# Patient Record
Sex: FEMALE | Race: WHITE | NOT HISPANIC OR LATINO | Employment: FULL TIME | URBAN - METROPOLITAN AREA
[De-identification: names, ages, dates, MRNs, and addresses within clinical notes are randomized per-mention and may not be internally consistent; named-entity substitution may affect disease eponyms.]

---

## 2019-09-09 ENCOUNTER — OFFICE VISIT (OUTPATIENT)
Dept: FAMILY MEDICINE CLINIC | Facility: CLINIC | Age: 24
End: 2019-09-09
Payer: COMMERCIAL

## 2019-09-09 VITALS
HEART RATE: 83 BPM | RESPIRATION RATE: 17 BRPM | DIASTOLIC BLOOD PRESSURE: 86 MMHG | TEMPERATURE: 97.6 F | SYSTOLIC BLOOD PRESSURE: 122 MMHG | OXYGEN SATURATION: 98 % | HEIGHT: 62 IN | WEIGHT: 137 LBS | BODY MASS INDEX: 25.21 KG/M2

## 2019-09-09 DIAGNOSIS — Z76.89 ENCOUNTER TO ESTABLISH CARE WITH NEW DOCTOR: ICD-10-CM

## 2019-09-09 DIAGNOSIS — Z13.220 LIPID SCREENING: ICD-10-CM

## 2019-09-09 DIAGNOSIS — L40.9 PSORIASIS: Primary | ICD-10-CM

## 2019-09-09 DIAGNOSIS — Z13.1 ENCOUNTER FOR SCREENING EXAMINATION FOR IMPAIRED GLUCOSE REGULATION AND DIABETES MELLITUS: ICD-10-CM

## 2019-09-09 PROCEDURE — 99203 OFFICE O/P NEW LOW 30 MIN: CPT | Performed by: FAMILY MEDICINE

## 2019-09-09 PROCEDURE — 3008F BODY MASS INDEX DOCD: CPT | Performed by: FAMILY MEDICINE

## 2019-09-09 RX ORDER — TRIAMCINOLONE ACETONIDE 1 MG/G
CREAM TOPICAL 2 TIMES DAILY
Qty: 30 G | Refills: 0 | Status: SHIPPED | OUTPATIENT
Start: 2019-09-09

## 2019-09-09 RX ORDER — DESOGESTREL AND ETHINYL ESTRADIOL 21-5 (28)
1 KIT ORAL DAILY
COMMUNITY
Start: 2019-04-26 | End: 2019-12-04 | Stop reason: ALTCHOICE

## 2019-09-09 NOTE — PROGRESS NOTES
Assessment/Plan:         Diagnoses and all orders for this visit:    Psoriasis  -     triamcinolone (KENALOG) 0 1 % cream; Apply topically 2 (two) times a day    Encounter to establish care with new doctor  Comments:  in good health; screening labs ordered, return for annual physical at her convenience    Lipid screening  -     Lipid panel; Future    Encounter for screening examination for impaired glucose regulation and diabetes mellitus  -     Comprehensive metabolic panel; Future            Subjective:   Chief Complaint   Patient presents with   Purific[address] 1076 PCP visit was about a year ago         Patient ID: Jessica Cuba is a 30 y o  female  New pt  Sees GYN at [place], no problems  Reports sleep problems 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, during summer goes away- sleep issue is awakening 2-3am for no reason and then hard to fall back to sleep  Pt works as       The following portions of the patient's history were reviewed and updated as appropriate: allergies, current medications, past family history, past medical history, past social history, past surgical history and problem list     Review of Systems   Constitutional: Negative  HENT: Negative for congestion, ear pain, mouth sores, nosebleeds, sore throat and trouble swallowing  Eyes: Negative  Respiratory: Negative  Cardiovascular: Negative  Gastrointestinal: Negative  Endocrine: Negative  Genitourinary: Negative for decreased urine volume, difficulty urinating, dysuria, flank pain, frequency and hematuria  Musculoskeletal: Negative for gait problem and joint swelling  Skin: Negative  Neurological: Negative  Hematological: Negative  Psychiatric/Behavioral: Positive for sleep disturbance (per hpi)  Negative for agitation, behavioral problems, confusion, decreased concentration, dysphoric mood, hallucinations, self-injury and suicidal ideas   The patient is not nervous/anxious and is not hyperactive  Objective:      /86 (BP Location: Left arm, Patient Position: Sitting, Cuff Size: Adult)   Pulse 83   Temp 97 6 °F (36 4 °C) (Tympanic)   Resp 17   Ht 5' 2 25" (1 581 m)   Wt 62 1 kg (137 lb)   SpO2 98%   BMI 24 86 kg/m²          Physical Exam   Constitutional: She is oriented to person, place, and time  She appears well-developed  She is cooperative  Non-toxic appearance  She does not have a sickly appearance  She does not appear ill  No distress  HENT:   Head: Normocephalic and atraumatic  Mouth/Throat: Uvula is midline, oropharynx is clear and moist and mucous membranes are normal    Eyes: Pupils are equal, round, and reactive to light  Conjunctivae and lids are normal    Neck: Trachea normal  Neck supple  No JVD present  No thyroid mass and no thyromegaly present  Cardiovascular: Normal rate, regular rhythm, normal heart sounds and normal pulses  Pulmonary/Chest: Effort normal and breath sounds normal    Abdominal: Soft  Bowel sounds are normal  She exhibits no distension and no mass  There is no hepatosplenomegaly  There is no tenderness  Lymphadenopathy:     She has no cervical adenopathy  Right: No supraclavicular adenopathy present  Left: No supraclavicular adenopathy present  Neurological: She is alert and oriented to person, place, and time  She has normal reflexes  Gait normal    Skin: Skin is warm and dry  Capillary refill takes less than 2 seconds  She is not diaphoretic  No cyanosis  No pallor  Psychiatric: She has a normal mood and affect  Her behavior is normal    Nursing note and vitals reviewed

## 2019-11-29 ENCOUNTER — APPOINTMENT (OUTPATIENT)
Dept: RADIOLOGY | Facility: HOSPITAL | Age: 24
End: 2019-11-29
Payer: COMMERCIAL

## 2019-11-29 ENCOUNTER — OFFICE VISIT (OUTPATIENT)
Dept: URGENT CARE | Facility: HOSPITAL | Age: 24
End: 2019-11-29
Payer: COMMERCIAL

## 2019-11-29 VITALS
SYSTOLIC BLOOD PRESSURE: 120 MMHG | DIASTOLIC BLOOD PRESSURE: 96 MMHG | WEIGHT: 136 LBS | OXYGEN SATURATION: 100 % | HEART RATE: 89 BPM | BODY MASS INDEX: 24.1 KG/M2 | HEIGHT: 63 IN | TEMPERATURE: 98 F

## 2019-11-29 DIAGNOSIS — S69.91XA INJURY OF RIGHT HAND, INITIAL ENCOUNTER: ICD-10-CM

## 2019-11-29 DIAGNOSIS — S62.356A CLOSED NONDISPLACED FRACTURE OF SHAFT OF FIFTH METACARPAL BONE OF RIGHT HAND, INITIAL ENCOUNTER: Primary | ICD-10-CM

## 2019-11-29 PROCEDURE — 73130 X-RAY EXAM OF HAND: CPT

## 2019-11-29 PROCEDURE — 29125 APPL SHORT ARM SPLINT STATIC: CPT | Performed by: NURSE PRACTITIONER

## 2019-11-29 PROCEDURE — 99213 OFFICE O/P EST LOW 20 MIN: CPT | Performed by: NURSE PRACTITIONER

## 2019-11-29 NOTE — PROGRESS NOTES
[facility]        NAME: Pilar Craig is a 30 y o  female  : 1995    MRN: [id number]  DATE: 2019  TIME: 2:52 PM    Assessment and Plan   Closed nondisplaced fracture of shaft of fifth metacarpal bone of right hand, initial encounter [S67 [id number]]  1  Closed nondisplaced fracture of shaft of fifth metacarpal bone of right hand, initial encounter  XR hand 3+ vw right     Splint application  Date/Time: 2019 2:52 PM  Performed by: OLIVE Burris  Authorized by: OLIVE Burris     Consent:     Consent obtained:  Verbal    Consent given by:  Patient    Risks discussed:  Discoloration, numbness, swelling and pain  Pre-procedure details:     Sensation:  Normal  Procedure details:     Laterality:  Right    Location:  Hand    Hand:  R hand    Splint type:  Ulnar gutter (static)  Post-procedure details:     Pain:  Unchanged    Sensation:  Normal    Patient tolerance of procedure: Tolerated well, no immediate complications        Patient Instructions     Patient Instructions   Keep splint on and keep dry  ortho appointment was scheduled for you  Recommend Tylenol every 4-6 hours and Motrin every 6-8 as needed for pain  Recommend elevating and icing right hand for 15 minutes increments  If you develop any increased pain, numbness, tingling, decreased sensation or any concerning symptoms please return or proceed ER  Hand Fracture   WHAT YOU NEED TO KNOW:   A hand fracture is a break in one of the bones in your hand  This includes the bones in the wrist and fingers, and those that connect the wrist to the fingers  A hand fracture may be caused by twisting or bending the hand in the wrong way  It may also be caused by a fall, a crush injury, or a sports injury  DISCHARGE INSTRUCTIONS:   Return to the emergency department if:   · Your have severe pain that does not get better, even with pain medicine  · Your injured hand or forearm is cold, numb, or pale       · Your cast or splint gets wet, damaged, or comes off  · Your arm feels warm, tender, and painful  It may look swollen and red  Contact your healthcare provider if:   · You have new sores around your brace, cast, or splint  · You notice a bad smell coming from under your cast     · You have questions or concerns about your condition or care  Medicines:   · NSAIDs , such as ibuprofen, help decrease swelling, pain, and fever  This medicine is available with or without a doctor's order  NSAIDs can cause stomach bleeding or kidney problems in certain people  If you take blood thinner medicine, always ask your healthcare provider if NSAIDs are safe for you  Always read the medicine label and follow directions  · Acetaminophen  decreases pain and fever  It is available without a doctor's order  Ask how much to take and how often to take it  Follow directions  Acetaminophen can cause liver damage if not taken correctly  · Prescription pain medicine  may be given  Ask how to take this medicine safely  · Take your medicine as directed  Contact your healthcare provider if you think your medicine is not helping or if you have side effects  Tell him or her if you are allergic to any medicine  Keep a list of the medicines, vitamins, and herbs you take  Include the amounts, and when and why you take them  Bring the list or the pill bottles to follow-up visits  Carry your medicine list with you in case of an emergency  Follow up with your healthcare provider or hand specialist as directed: You may need to return to have your cast, splint, or stitches removed  Write down your questions so you remember to ask them during your visits  Manage your symptoms:   · Wear your splint as directed  Do not remove the splint until you follow up with your healthcare provider or hand specialist      · Apply ice  on your hand for 15 to 20 minutes every hour or as directed  Use an ice pack, or put crushed ice in a plastic bag   Cover it with a towel before you apply it to your skin  Ice helps prevent tissue damage and decreases swelling and pain  · Elevate  your hand above the level of his or her heart as often as you can  This will help decrease swelling and pain  Prop your hand on pillows or blankets to keep it elevated comfortably  · Go to physical therapy as directed  A physical therapist teaches you exercises to help improve movement and strength and to decrease pain  Bathing with a cast or splint:  Do not let your cast or splint get wet  Before bathing, cover the cast or splint with a plastic bag  Tape the bag to your skin above the cast or splint to seal out the water  Keep your hand out of the water in case the bag leaks  Follow instructions about when it is okay to take a bath or shower  Cast or splint care:   · Check the skin around the cast or splint for redness or sores every day  · Do not push down or lean on any part of the cast or splint because it may break  · Do not use a sharp or pointed object to scratch your skin under the cast or splint  Activity:  You may not be able to drive for up to 2 weeks  Ask when it is safe for you to drive and return to other activities such as sports  © 2017 [address]  Information is for End User's use only and may not be sold, redistributed or otherwise used for commercial purposes  All illustrations and images included in CareNotes® are the copyrighted property of [employer]  or [employer]  The above information is an  only  It is not intended as medical advice for individual conditions or treatments  Talk to your doctor, nurse or pharmacist before following any medical regimen to see if it is safe and effective for you  Follow up with PCP in 3-5 days  Proceed to  ER if symptoms worsen      Chief Complaint     Chief Complaint   Patient presents with    Pain     R hand pain injury while working on truck last night History of Present Illness       Patient is a 61-year-old female presents with a one-day history of right hand pain  Patient states she was working on her truck and had a ratchet in her right hand, states that ratchet slipped and she hit her right hand on the frame of the truck  Patient complaining of pain and swelling of right hand  Patient denies any numbness, tingling, decreased sensation, decreased range of motion of right hand  Patient denies any previous injury or trauma to right hand  States that she took Motrin yesterday with moderate relief  Review of Systems   Review of Systems   Constitutional: Negative  HENT: Negative  Eyes: Negative  Respiratory: Negative  Cardiovascular: Negative  Musculoskeletal: Positive for arthralgias and joint swelling  Negative for back pain, gait problem, myalgias, neck pain and neck stiffness  Skin: Negative for rash and wound  Neurological: Negative  Current Medications       Current Outpatient Medications:     triamcinolone (KENALOG) 0 1 % cream, Apply topically 2 (two) times a day, Disp: 30 g, Rfl: 0    desogestrel-ethinyl estradiol (Daniel Marsh) 0 15-0 02/0 01 MG (21/5) per tablet, Take 1 tablet by mouth daily, Disp: , Rfl:     Current Allergies     Allergies as of 11/29/2019 - Reviewed 11/29/2019   Allergen Reaction Noted    Penicillin g Hives 01/22/2016            The following portions of the patient's history were reviewed and updated as appropriate: allergies, current medications, past family history, past medical history, past social history, past surgical history and problem list      No past medical history on file  No past surgical history on file      Family History   Problem Relation Age of Onset    Hypertension Mother     Hyperlipidemia Father     Breast cancer Maternal Grandmother     Diabetes Paternal Grandfather     Cancer Paternal Grandfather     No Known Problems Brother          Medications have been verified  Objective   /96   Pulse 89   Temp 98 °F (36 7 °C)   Ht 5' 3" (1 6 m)   Wt 61 7 kg (136 lb)   SpO2 100%   BMI 24 09 kg/m²        Physical Exam     Physical Exam   Constitutional: She is oriented to person, place, and time  She appears well-developed and well-nourished  No distress  HENT:   Head: Normocephalic and atraumatic  Cardiovascular: Normal rate, regular rhythm, S1 normal, S2 normal, normal heart sounds, intact distal pulses and normal pulses  Pulses:       Radial pulses are 2+ on the right side, and 2+ on the left side  Pulmonary/Chest: Effort normal and breath sounds normal    Musculoskeletal:        Right hand: She exhibits tenderness, bony tenderness and swelling  She exhibits normal range of motion, normal capillary refill, no deformity and no laceration  Normal sensation noted  Normal strength noted  Hands:  Neurological: She is alert and oriented to person, place, and time  GCS eye subscore is 4  GCS verbal subscore is 5  GCS motor subscore is 6  Skin: Skin is warm and dry  Capillary refill takes less than 2 seconds  She is not diaphoretic

## 2019-11-29 NOTE — PATIENT INSTRUCTIONS
Keep splint on and keep dry  ortho appointment was scheduled for you  Recommend Tylenol every 4-6 hours and Motrin every 6-8 as needed for pain  Recommend elevating and icing right hand for 15 minutes increments  If you develop any increased pain, numbness, tingling, decreased sensation or any concerning symptoms please return or proceed ER  Hand Fracture   WHAT YOU NEED TO KNOW:   A hand fracture is a break in one of the bones in your hand  This includes the bones in the wrist and fingers, and those that connect the wrist to the fingers  A hand fracture may be caused by twisting or bending the hand in the wrong way  It may also be caused by a fall, a crush injury, or a sports injury  DISCHARGE INSTRUCTIONS:   Return to the emergency department if:   · Your have severe pain that does not get better, even with pain medicine  · Your injured hand or forearm is cold, numb, or pale  · Your cast or splint gets wet, damaged, or comes off  · Your arm feels warm, tender, and painful  It may look swollen and red  Contact your healthcare provider if:   · You have new sores around your brace, cast, or splint  · You notice a bad smell coming from under your cast     · You have questions or concerns about your condition or care  Medicines:   · NSAIDs , such as ibuprofen, help decrease swelling, pain, and fever  This medicine is available with or without a doctor's order  NSAIDs can cause stomach bleeding or kidney problems in certain people  If you take blood thinner medicine, always ask your healthcare provider if NSAIDs are safe for you  Always read the medicine label and follow directions  · Acetaminophen  decreases pain and fever  It is available without a doctor's order  Ask how much to take and how often to take it  Follow directions  Acetaminophen can cause liver damage if not taken correctly  · Prescription pain medicine  may be given  Ask how to take this medicine safely      · Take your medicine as directed  Contact your healthcare provider if you think your medicine is not helping or if you have side effects  Tell him or her if you are allergic to any medicine  Keep a list of the medicines, vitamins, and herbs you take  Include the amounts, and when and why you take them  Bring the list or the pill bottles to follow-up visits  Carry your medicine list with you in case of an emergency  Follow up with your healthcare provider or hand specialist as directed: You may need to return to have your cast, splint, or stitches removed  Write down your questions so you remember to ask them during your visits  Manage your symptoms:   · Wear your splint as directed  Do not remove the splint until you follow up with your healthcare provider or hand specialist      · Apply ice  on your hand for 15 to 20 minutes every hour or as directed  Use an ice pack, or put crushed ice in a plastic bag  Cover it with a towel before you apply it to your skin  Ice helps prevent tissue damage and decreases swelling and pain  · Elevate  your hand above the level of his or her heart as often as you can  This will help decrease swelling and pain  Prop your hand on pillows or blankets to keep it elevated comfortably  · Go to physical therapy as directed  A physical therapist teaches you exercises to help improve movement and strength and to decrease pain  Bathing with a cast or splint:  Do not let your cast or splint get wet  Before bathing, cover the cast or splint with a plastic bag  Tape the bag to your skin above the cast or splint to seal out the water  Keep your hand out of the water in case the bag leaks  Follow instructions about when it is okay to take a bath or shower  Cast or splint care:   · Check the skin around the cast or splint for redness or sores every day  · Do not push down or lean on any part of the cast or splint because it may break      · Do not use a sharp or pointed object to scratch your skin under the cast or splint  Activity:  You may not be able to drive for up to 2 weeks  Ask when it is safe for you to drive and return to other activities such as sports  © 2017 [address] [place] Information is for End User's use only and may not be sold, redistributed or otherwise used for commercial purposes  All illustrations and images included in CareNotes® are the copyrighted property of [employer] Inc  or [employer] Daniel  The above information is an  only  It is not intended as medical advice for individual conditions or treatments  Talk to your doctor, nurse or pharmacist before following any medical regimen to see if it is safe and effective for you

## 2019-12-02 RX ORDER — MEDROXYPROGESTERONE ACETATE 150 MG/ML
150 INJECTION, SUSPENSION INTRAMUSCULAR
COMMUNITY
Start: 2019-09-19

## 2019-12-04 ENCOUNTER — OFFICE VISIT (OUTPATIENT)
Dept: OBGYN CLINIC | Facility: CLINIC | Age: 24
End: 2019-12-04
Payer: COMMERCIAL

## 2019-12-04 ENCOUNTER — APPOINTMENT (OUTPATIENT)
Dept: RADIOLOGY | Facility: CLINIC | Age: 24
End: 2019-12-04
Payer: COMMERCIAL

## 2019-12-04 VITALS
SYSTOLIC BLOOD PRESSURE: 110 MMHG | HEART RATE: 96 BPM | DIASTOLIC BLOOD PRESSURE: 62 MMHG | WEIGHT: 140 LBS | BODY MASS INDEX: 24.8 KG/M2 | HEIGHT: 63 IN

## 2019-12-04 DIAGNOSIS — M79.641 PAIN OF RIGHT HAND: Primary | ICD-10-CM

## 2019-12-04 DIAGNOSIS — S62.356A CLOSED NONDISPLACED FRACTURE OF SHAFT OF FIFTH METACARPAL BONE OF RIGHT HAND, INITIAL ENCOUNTER: ICD-10-CM

## 2019-12-04 PROCEDURE — 99204 OFFICE O/P NEW MOD 45 MIN: CPT | Performed by: ORTHOPAEDIC SURGERY

## 2019-12-04 PROCEDURE — 73130 X-RAY EXAM OF HAND: CPT

## 2019-12-04 PROCEDURE — 26600 TREAT METACARPAL FRACTURE: CPT | Performed by: ORTHOPAEDIC SURGERY

## 2019-12-04 NOTE — PROGRESS NOTES
ASSESSMENT/PLAN:    Diagnoses and all orders for this visit:    Pain of right hand    Closed nondisplaced fracture of shaft of fifth metacarpal bone of right hand, initial encounter      Plan:  I discussed treatment options  Since there is a very slight degree of rotational malalignment appreciated flexion, a derotational maneuver was performed followed by taping of the small finger to the ring finger  An ulnar gauntlet cast was then applied  Post casting x-rays were obtained demonstrating the fracture to remain acceptably aligned with a minimal degree of volar angulation  I would recommend follow-up 5 weeks with cast removal followed by x-rays out of her cast   Precautions have been reviewed, instructions provided and questions answered  She is to contact me if any questions or concerns arise  No follow-ups on file       _____________________________________________________  CHIEF COMPLAINT:  Chief Complaint   Patient presents with    Right Hand - Fracture, Pain, Swelling    Hand Injury     pt states injured R hand working her truck, when attempt lossen a bolt, hand hit frame of vehicle  SUBJECTIVE:  Skye Aguirre is a 30y o  year old right-hand-dominant female who presents for evaluation of her right hand femur she injured herself on 11/28/2019  She was working on a car, the bolt she loosening snapped and her slipped, striking the frame of the vehicle  She was seen at the urgent care center on 11/29/2019, x-rays were obtained and she was placed into a splint  She now presents for orthopedic evaluation and treatment  He does believe there has been some minor improvement over the last 6 days  She denies paresthesias  She denies history of prior injury and denies any additional injuries  PAST MEDICAL HISTORY:  Past Medical History:   Diagnosis Date    Fractures     Hand injury, right, initial encounter 11/28/2019       PAST SURGICAL HISTORY:  History reviewed   No pertinent surgical history  FAMILY HISTORY:  Family History   Problem Relation Age of Onset    Hypertension Mother     Hyperlipidemia Father     Breast cancer Maternal Grandmother     Diabetes Paternal Grandfather     Cancer Paternal Grandfather     No Known Problems Brother        SOCIAL HISTORY:  Social History     Tobacco Use    Smoking status: Never Smoker    Smokeless tobacco: Never Used   Substance Use Topics    Alcohol use: Yes     Frequency: Monthly or less     Comment: socailly     Drug use: Never       MEDICATIONS:    Current Outpatient Medications:     medroxyPROGESTERone (DEPO-PROVERA) 150 mg/mL injection, Inject 150 mg into a muscle, Disp: , Rfl:     triamcinolone (KENALOG) 0 1 % cream, Apply topically 2 (two) times a day, Disp: 30 g, Rfl: 0    ALLERGIES:  Allergies   Allergen Reactions    Penicillin G Hives       Review of systems:   Constitutional: Negative for fatigue, fever or loss of apetite  HENT: Negative  Respiratory: Negative for shortness of breath, dyspnea  Cardiovascular: Negative for chest pain/tightness  Gastrointestinal: Negative for abdominal pain, N/V  Endocrine: Negative for cold/heat intolerance, unexplained weight loss/gain  Genitourinary: Negative for flank pain, dysuria, hematuria  Musculoskeletal:  Positive as in the HPI   Skin: Negative for rash  Neurological:  Negative  Psychiatric/Behavioral: Negative for agitation  _____________________________________________________  PHYSICAL EXAMINATION:    Blood pressure 110/62, pulse 96, height 5' 3" (1 6 m), weight 63 5 kg (140 lb)  General: well developed and well nourished, alert, oriented times 3 and appears comfortable  Psychiatric: Normal  HEENT: Benign  Cardiovascular: Regular    Pulmonary: No wheezing or stridor  Abdomen: Soft, Nontender  Skin: No masses, erythema, lacerations, fluctation, ulcerations  Neurovascular: Motor and sensory exams are grossly intact  Pulses palpable      MUSCULOSKELETAL EXAMINATION:    The right hand exam demonstrates the splint in place  This was removed without difficulty  There is no gross deformity in the right hand has minimal swelling  There is resolving ecchymosis noted  She does have tenderness to palpation of the 5th metacarpal, primarily along its ulnar aspect  With flexion, there is a slight supination deformity although in extension, there is no rotational or angular malalignment noted  The supination deformity is not significantly different than the contralateral left side during active flexion of both the left and right sides in comparison  The remaining metacarpals of the right hand and the phalanges of all digits are nontender  The remainder of the upper extremity examination, bilaterally, is benign  _____________________________________________________  STUDIES REVIEWED:  X-rays of her hand dated 11/29/2019 were reviewed  These demonstrated a fracture of the right 5th metacarpal shaft with a minimal degree of volar angulation  There does not appear to be any significant rotational malalignment more shortening noted  The report was reviewed  PROCEDURES:  Fracture / Dislocation Treatment  Date/Time: 12/4/2019 4:07 PM  Performed by: Behzad Garner  Authorized by: Behzad Garner     Patient Location:  Clinic  Verbal consent obtained?: Yes    Written consent obtained?: No    Risks and benefits: Risks, benefits and alternatives were discussed    Consent given by:  Patient  Patient states understanding of procedure being performed: Yes    Test results available and properly labeled: Yes    Radiology Images displayed and confirmed   If images not available, report reviewed: Yes    Injury location:  Hand  Location details:  Right hand  Injury type:  Fracture  Fracture type: fifth metacarpal    Neurovascular status: Neurovascularly intact    Local anesthesia used?: No    Manipulation performed?: No    Cast type:  Gaunlet  Patient tolerance:  Patient tolerated the procedure well with no immediate complications          Nataly Rachel

## 2020-01-08 ENCOUNTER — OFFICE VISIT (OUTPATIENT)
Dept: OBGYN CLINIC | Facility: CLINIC | Age: 25
End: 2020-01-08

## 2020-01-08 ENCOUNTER — APPOINTMENT (OUTPATIENT)
Dept: RADIOLOGY | Facility: CLINIC | Age: 25
End: 2020-01-08
Payer: COMMERCIAL

## 2020-01-08 VITALS
HEIGHT: 63 IN | SYSTOLIC BLOOD PRESSURE: 122 MMHG | DIASTOLIC BLOOD PRESSURE: 70 MMHG | BODY MASS INDEX: 24.1 KG/M2 | WEIGHT: 136 LBS

## 2020-01-08 DIAGNOSIS — S62.356D CLOSED NONDISPLACED FRACTURE OF SHAFT OF FIFTH METACARPAL BONE OF RIGHT HAND WITH ROUTINE HEALING, SUBSEQUENT ENCOUNTER: ICD-10-CM

## 2020-01-08 DIAGNOSIS — S62.356D CLOSED NONDISPLACED FRACTURE OF SHAFT OF FIFTH METACARPAL BONE OF RIGHT HAND WITH ROUTINE HEALING, SUBSEQUENT ENCOUNTER: Primary | ICD-10-CM

## 2020-01-08 PROCEDURE — 99024 POSTOP FOLLOW-UP VISIT: CPT | Performed by: ORTHOPAEDIC SURGERY

## 2020-01-08 PROCEDURE — 73130 X-RAY EXAM OF HAND: CPT

## 2020-01-08 NOTE — PROGRESS NOTES
Patient Name:  Bhavin Sweeney  MRN:  [id number]    Assessment     1  Closed nondisplaced fracture of shaft of fifth metacarpal bone of right hand with routine healing, subsequent encounter  XR hand 3+ vw right    Ambulatory referral to PT/OT hand therapy       Plan     1  Discussed with patient that although she show signs of healing, fracture is not fully healed at this time  2  Ulnar gutter Ortho Glass splint applied at time of visit  3  Referral to occupational therapy for custom ulnar gutter splint  4  Referral to occupational therapy to work on range of motion  5  Continue NSAIDs/Tylenol as needed for pain and soreness    Return 3-4 weeks, for Repeat x-rays 3 views right hand, , Re-evaluation  Subjective   Bhavin Sweeney returns for follow-up of right 5th metacarpal fracture  The patient is 6 week(s) post injury and returns for routine follow-up  Patient has been consistent with cast care instructions  She reports occasional pain in the cast, for which she was able to take Advil and successfully alleviated symptoms  She denies any subsequent bruising, swelling, numbness, or tingling  Objective     /70 (BP Location: Left arm, Patient Position: Sitting, Cuff Size: Adult)   Ht 5' 3" (1 6 m)   Wt 61 7 kg (136 lb)   BMI 24 09 kg/m²     Right hand - ulnar gutter cast was intact at time of visit  There is no signs of ecchymosis or soft tissue swelling either proximal or distal to the cast   Cast was removed at time of visit without difficulty  Skin is warm and dry with no signs of erythema, ecchymosis, or infection  Patient is nontender to palpation over the 5th metacarpal shaft  She demonstrates small and ring finger MP joint stiffness secondary to immobilization  FDS, FDP, and extensor mechanisms are intact  Radial, median, and ulnar motor and sensory distributions are intact  2+ distal radial pulse with brisk capillary refill to the fingers    Sensation light touch intact distally  Data Review     Attending Physician has personally reviewed pertinent imaging and/or reports in PACS, impression is as follows:    Review of radiographic series taken 1/8/2020 of the right his shows maintained alignment of 5th metacarpal shaft fracture with early callus formation      Scribe Attestation    I,:   Narciso Ames am acting as a scribe while in the presence of the attending physician :        I,:   Deysi Escudero personally performed the services described in this documentation    as scribed in my presence :

## 2020-01-13 ENCOUNTER — OFFICE VISIT (OUTPATIENT)
Dept: URGENT CARE | Facility: HOSPITAL | Age: 25
End: 2020-01-13
Payer: COMMERCIAL

## 2020-01-13 VITALS
HEART RATE: 92 BPM | TEMPERATURE: 97.3 F | BODY MASS INDEX: 22.71 KG/M2 | SYSTOLIC BLOOD PRESSURE: 129 MMHG | WEIGHT: 133 LBS | DIASTOLIC BLOOD PRESSURE: 90 MMHG | OXYGEN SATURATION: 100 % | RESPIRATION RATE: 18 BRPM | HEIGHT: 64 IN

## 2020-01-13 DIAGNOSIS — S06.0X0A CONCUSSION WITHOUT LOSS OF CONSCIOUSNESS, INITIAL ENCOUNTER: Primary | ICD-10-CM

## 2020-01-13 PROCEDURE — 99213 OFFICE O/P EST LOW 20 MIN: CPT | Performed by: NURSE PRACTITIONER

## 2020-01-13 NOTE — LETTER
January 13, 2020     Patient: Pilar Craig   YOB: 1995   Date of Visit: 1/13/2020       To Whom It May Concern: It is my medical opinion that Faridaemerson Mckenzie may return to work on 01/14/2020  If you have any questions or concerns, please don't hesitate to call           Sincerely,        GARDENIA MUJICA    CC: No Recipients

## 2020-01-13 NOTE — PROGRESS NOTES
[facility]        NAME: Krista Paget is a 30 y o  female  : 1995    MRN: [id number]  DATE: 2020  TIME: 4:24 PM    Assessment and Plan   Concussion without loss of consciousness, initial encounter [S06 [id number]]  1  Concussion without loss of consciousness, initial encounter  Ambulatory referral to Sports Medicine         Patient Instructions     Patient Instructions   As we discussed symptoms seem to be consistent with a concussion  At this time I would recommend brain rest which include avoiding bright DVTs, cell phones, bright lights, avoid excessive reading  Tylenol or Motrin as needed for pain  Follow up with concussion clinic or PCP  Go to the ER with any worsening symptoms, increasing headache, nausea, vomiting, dizziness or lethargy  Chief Complaint     Chief Complaint   Patient presents with    Headache     Patient c/o headaches x 1 week after hitting head off of medicine cabinet at home, denies LOC         History of Present Illness   Krista Paget presents to the clinic c/o    This is a 57-year-old female here today with complaints of frontal headache  She states 10 days ago she had the back of her head on the medicine cabinet when she was bent over and then went to a standing position  She states 2 days later she started to develop headache  She denies any loss of consciousness during the injury  She states pain is primarily over the frontal aspect of her head  She states she does have some light sensitivity and sensitivity to noise  She denies any nausea, vomiting, dizziness, confusion or change in gait  No history of concussions in the past   No fevers bodies or chills      Review of Systems   Review of Systems   Constitutional: Negative  Negative for activity change, fatigue and fever  Eyes: Negative for photophobia, pain, redness and visual disturbance  Respiratory: Negative  Cardiovascular: Negative  Musculoskeletal: Negative      Skin: Negative  Neurological: Positive for headaches  Negative for dizziness, syncope, weakness and light-headedness  Psychiatric/Behavioral: Negative  Current Medications     Long-Term Medications   Medication Sig Dispense Refill    medroxyPROGESTERone (DEPO-PROVERA) 150 mg/mL injection Inject 150 mg into a muscle      triamcinolone (KENALOG) 0 1 % cream Apply topically 2 (two) times a day (Patient not taking: Reported on 1/13/2020) 30 g 0       Current Allergies     Allergies as of 01/13/2020 - Reviewed 01/13/2020   Allergen Reaction Noted    Penicillin g Hives 01/22/2016            The following portions of the patient's history were reviewed and updated as appropriate: allergies, current medications, past family history, past medical history, past social history, past surgical history and problem list     Objective   /90   Pulse 92   Temp (!) 97 3 °F (36 3 °C) (Tympanic)   Resp 18   Ht 5' 4" (1 626 m)   Wt 60 3 kg (133 lb)   SpO2 100%   BMI 22 83 kg/m²        Physical Exam     Physical Exam   Constitutional: She is oriented to person, place, and time  She appears well-developed and well-nourished  Cardiovascular: Normal rate, regular rhythm and normal heart sounds  Pulmonary/Chest: Effort normal and breath sounds normal    Neurological: She is alert and oriented to person, place, and time  No cranial nerve deficit or sensory deficit  Coordination normal    Psychiatric: She has a normal mood and affect  Her behavior is normal    Nursing note and vitals reviewed

## 2020-01-13 NOTE — PATIENT INSTRUCTIONS
As we discussed symptoms seem to be consistent with a concussion  At this time I would recommend brain rest which include avoiding bright DVTs, cell phones, bright lights, avoid excessive reading  Tylenol or Motrin as needed for pain  Follow up with concussion clinic or PCP  Go to the ER with any worsening symptoms, increasing headache, nausea, vomiting, dizziness or lethargy  Concussion   WHAT YOU NEED TO KNOW:   A concussion is a mild brain injury  It is usually caused by a bump or blow to the head from a fall, a motor vehicle crash, or a sports injury  Sometimes being shaken forcefully may cause a concussion  DISCHARGE INSTRUCTIONS:   Have someone else call 911 for the following:   · Someone tries to wake you and cannot do so  · You have a seizure, increasing confusion, or a change in personality  · Your speech becomes slurred, or you have new vision problems  Return to the emergency department if:   · You have a severe headache that does not go away  · You have arm or leg weakness, numbness, or new problems with coordination  · You have blood or clear fluid coming out of the ears or nose  Contact your healthcare provider if:   · You have nausea or are vomiting  · You feel more sleepy than usual     · Your symptoms get worse  · Your symptoms last longer than 6 weeks after the injury  · You have questions or concerns about your condition or care  Medicines:   · Acetaminophen  helps to decrease pain  It is available without a doctor's order  Ask how much to take and how often to take it  Follow directions  Acetaminophen can cause liver damage if not taken correctly  · NSAIDs , such as ibuprofen, help decrease swelling and pain  NSAIDs can cause stomach bleeding or kidney problems in certain people  If you take blood thinner medicine, always ask your healthcare provider if NSAIDs are safe for you  Always read the medicine label and follow directions      · Take your medicine as directed  Contact your healthcare provider if you think your medicine is not helping or if you have side effects  Tell him or her if you are allergic to any medicine  Keep a list of the medicines, vitamins, and herbs you take  Include the amounts, and when and why you take them  Bring the list or the pill bottles to follow-up visits  Carry your medicine list with you in case of an emergency  Follow up with your healthcare provider as directed:  Write down your questions so you remember to ask them during your visits  Self-care:   · Rest  from physical and mental activities as directed  Mental activities are those that require thinking, concentration, and attention  You will need to rest until your symptoms are gone  Rest will allow you to recover from your concussion  Ask your healthcare provider when you can return to work and other daily activities  · Have someone stay with you for the first 24 hours after your injury  Your healthcare provider should be contacted if your symptoms get worse, or you develop new symptoms  · Do not participate in sports and physical activities until your healthcare provider says it is okay  They could make your symptoms worse or lead to another concussion  Your healthcare provider will tell you when it is okay for you to return to sports or physical activities  Prevent another concussion:   · Wear protective sports equipment that fit properly  Helmets help decrease your risk of a serious brain injury  Talk to your healthcare provider about ways you can decrease your risk for a concussion if you play sports  · Wear your seat belt  every time you travel  This helps to decrease your risk of a head injury if you are in a car accident  © 2017 [address] [place] Information is for End User's use only and may not be sold, redistributed or otherwise used for commercial purposes   All illustrations and images included in CareNotes® are the copyrighted property of [employer]  or Heriberto Daniel  The above information is an  only  It is not intended as medical advice for individual conditions or treatments  Talk to your doctor, nurse or pharmacist before following any medical regimen to see if it is safe and effective for you

## 2020-01-15 NOTE — PROGRESS NOTES
OT Evaluation     Today's date: 2020  Patient name: Mane Paul  : 1995  MRN: [id number]  Referring provider: Terri Webb DO  Dx:   Encounter Diagnosis     ICD-10-CM    1  Closed nondisplaced fracture of shaft of fifth metacarpal bone of right hand with routine healing, subsequent encounter L39 [id number]                   Assessment  Assessment details: Mane Paul is a 30 y o  female with a diagnosis of right fracture of little digit  A low complexity evaluation was completed today  The patient is having difficulty with opening jar lids, driving, and carrying heavy objects  Findings show an impairment with ROM, strength, activity tolerance, and pain which limits completion of ADL's/IADL's  Patient will benefit from OT services to reach goals  Impairments: abnormal or restricted ROM, impaired physical strength, lacks appropriate home exercise program and pain with function    Symptom irritability: lowUnderstanding of Dx/Px/POC: good   Prognosis: good    Goals  STG's In 2 weeks:  1  Patient will report a decreased pain level of 3/10 at its worst   2  Patient will improve AROM of right wrist flex/ext to 60, MCP flex D4&5 to 75, Pip flex D4&5 to 95, DIP flex D4 & 5 to 72 for making a full fist    3   Patient will increase strength of right  to 43#, lateral pinch to 11# for lifting and carrying items  4  Patient will demonstrate good carryover and independence with HEP   LTG's In 4 weeks:  1  Patient will report a decreased pain level of 1/10 in right wrist with activity  2   Patient will improve AROM of right wrist flex/ext to 65, MCP flex D4&5 to 80, PIP flex D4&5 to 100, DIP flex D4 & 5 to 80  for self-care tasks  3  Patient will increase strength of right  to 50#, lateral pinch to 15#, wrist flex/ext to 5/5 for lifting and carrying items  4  Patient will decrease edema by > 5 cm in right wrist/MCP's      Plan  Patient would benefit from: OT eval  Planned modality interventions: thermotherapy: hydrocollator packs, cryotherapy and fluidotherapy  Planned therapy interventions: therapeutic exercise, strengthening, stretching, joint mobilization, manual therapy, functional ROM exercises and home exercise program  Duration in visits: 4  Duration in weeks: 4  Plan of Care beginning date: 2020  Plan of Care expiration date: 2020  Treatment plan discussed with: patient        Subjective Evaluation    History of Present Illness  Date of onset: 2019  Mechanism of injury: Clemente Barrett is a 30y o  year old right-hand-dominant female who presents for evaluation of her right hand femur she injured herself on 2019  She was working on a car, the bolt she loosening snapped and her slipped, striking the frame of the vehicle  She was seen at the urgent care center on 2019, x-rays were obtained and she was placed into a splint  She now presents for orthopedic evaluation and treatment  He does believe there has been some minor improvement over the last 6 days  She denies paresthesias  She denies history of prior injury and denies any additional injuries  On 19 patient had follow up appointment with Dr Meseret Roa  Pain  Current pain ratin  At best pain ratin  At worst pain ratin  Location: pinky  Quality: pressure  Relieving factors: medications    Social Support  Lives with: spouse    Employment status: working  Hand dominance: right      Diagnostic Tests  X-ray: abnormal    FCE comments: Bones still are healing      Objective     Observations     Additional Observation Details  Patient arrived with an ulnar gutter splint with ace wrap on right hand  She reports wearing splint at all times except for bathing and stretches  Patient did not want therapist to fabricate an orthoplast splint due to cost/deductible of insurance  She stated that she was fine wearing the splint for the next 2 weeks until her follow-up appointment with Dr Meseret Roa      Active Range of Motion     Left Wrist   Wrist flexion: 65 degrees   Wrist extension: 65 degrees   Radial deviation: 25 degrees   Ulnar deviation: 35 degrees     Right Wrist   Wrist flexion: 55 degrees   Wrist extension: 55 degrees   Radial deviation: 20 degrees   Ulnar deviation: 22 degrees     Left Digits    Flexion   Index     MCP: 80  Middle     MCP: 80  Ring     MCP: 80  Little     MCP: 80  Extension   Index     MCP: 0    PIP: 0    DIP: 0  Middle     MCP: 0    PIP: 0    DIP: 0  Ring     MCP: 0    PIP: 0    DIP: 0  Little     MCP: 0    PIP: 0    DIP: 0    Right Digits   Flexion   Index     MCP: 80    PIP: 105    DIP: 67  Middle     MCP: 80    PIP: 105    DIP: 80  Ring     MCP: 76    PIP: 105    DIP: 65  Little     MCP: 62    PIP: 88    DIP: 65  Extension   Index     MCP: 0    PIP: 0    DIP: 0  Middle     MCP: 0    PIP: 0    DIP: 0  Ring     MCP: 0    PIP: 0    DIP: 0  Little     MCP: 0    PIP: 0    DIP: 0    Strength/Myotome Testing     Left Wrist/Hand   Normal wrist strength     (2nd hand position)     Trial 1: 50    Trial 2: 55    Thumb Strength  Key/Lateral Pinch     Trail 1: 15  Tip/Two-Point Pinch     Trial 1: 9    Right Wrist/Hand   Wrist extension: 4  Wrist flexion: 4-  Radial deviation: 4  Ulnar deviation: 4-     (2nd hand position)     Trial 1: 40    Trial 2: 33    Thumb Strength   Key/Lateral Pinch     Trial 1: 9  Tip/Two-Point Pinch     Trial 1: 10    Swelling     Left Wrist/Hand   Circumference MCP: 18 5 cm  Circumference wrist: 15 2 cm    Right Wrist/Hand   Circumference MCP: 19 5 cm  Circumference wrist: 15 6 cm             Precautions: no strengthening, wear ulnar gutter splint at all times            Next Dr  appt: 1/29/20, re-eval: 1/28/20  Manual                                                     Exercise Diary  1/16/20       Active stretch hold 10 sec    MCP flex  PIP flex  DIP flex All digits    5  5  5       Blocking of DIP joint D4 & 5 Hold 5 sec    5       Wrist flex/ext Hold 10 sec  3 UD/RD Hold 10 sec   3x                                                                                                                                           Modalities

## 2020-01-16 ENCOUNTER — EVALUATION (OUTPATIENT)
Dept: OCCUPATIONAL THERAPY | Facility: CLINIC | Age: 25
End: 2020-01-16
Payer: COMMERCIAL

## 2020-01-16 DIAGNOSIS — S62.356D CLOSED NONDISPLACED FRACTURE OF SHAFT OF FIFTH METACARPAL BONE OF RIGHT HAND WITH ROUTINE HEALING, SUBSEQUENT ENCOUNTER: Primary | ICD-10-CM

## 2020-01-16 PROCEDURE — 97165 OT EVAL LOW COMPLEX 30 MIN: CPT

## 2020-01-16 PROCEDURE — 97110 THERAPEUTIC EXERCISES: CPT

## 2020-01-20 ENCOUNTER — OFFICE VISIT (OUTPATIENT)
Dept: OBGYN CLINIC | Facility: CLINIC | Age: 25
End: 2020-01-20
Payer: COMMERCIAL

## 2020-01-20 VITALS
BODY MASS INDEX: 22.36 KG/M2 | WEIGHT: 131 LBS | SYSTOLIC BLOOD PRESSURE: 122 MMHG | DIASTOLIC BLOOD PRESSURE: 84 MMHG | HEIGHT: 64 IN

## 2020-01-20 DIAGNOSIS — S06.0X0A CONCUSSION WITHOUT LOSS OF CONSCIOUSNESS, INITIAL ENCOUNTER: Primary | ICD-10-CM

## 2020-01-20 PROCEDURE — 99213 OFFICE O/P EST LOW 20 MIN: CPT | Performed by: EMERGENCY MEDICINE

## 2020-01-20 NOTE — PROGRESS NOTES
Assessment/Plan:    Concussion with no LOC, initial encounter    Patient may continue over-the-counter medications but limits to 3-4 days a week at max  She is feeling more anxious worried about making up work as she is in addition to working going to [employer]  She does admit to feeling safe at home  She may continue her current routine will see her back in 2-3 weeks for re-evaluation  Return for 2-3 weeks  CC:  Head injury    Subjective:   Patient ID: Bao Ng is a 30 y o  female  New patient presents for head injury occurring 01/04/2020 after purposefully hitting her head against the wall during an argument  Denies loss of consciousness or amnesia to the event states she did feel okay that and the following day however several days later experience headaches  Patient is a teacher notices a headaches worsen as the day goes on overall she is improving in her symptoms  She has been taking Tylenol 1 g every 6-8 hours for the most part daily  She denies any previous history of concussions or migraines  She does confess to anxiety however is not taking any medications for this  She does feel safe at home when questioned  Review of Systems   Constitutional: Negative  HENT: Negative  Eyes: Negative  Respiratory: Negative  Cardiovascular: Negative  Gastrointestinal: Negative  Endocrine: Negative  Genitourinary: Negative  Musculoskeletal: Negative for neck pain and neck stiffness  Skin: Negative  Neurological: Positive for headaches  Negative for weakness and numbness  Psychiatric/Behavioral: The patient is nervous/anxious  The following portions of the patient's chart were reviewed and updated as appropriate: Allergie  Allergies   Allergen Reactions    Penicillin G Hives   s   Allergen Reactions    Penicillin G Hives      Diagnosis Date    Fractures     Hand injury, right, initial encounter 11/28/2019       History reviewed   No pertinent surgical history      Social History     Socioeconomic History    Marital status: /Civil Union     Spouse name: Not on file    Number of children: Not on file    Years of education: Not on file    Highest education level: Not on file   Occupational History    Not on file   Social Needs    Financial resource strain: Not on file    Food insecurity:     Worry: Not on file     Inability: Not on file    Transportation needs:     Medical: Not on file     Non-medical: Not on file   Tobacco Use    Smoking status: Never Smoker    Smokeless tobacco: Never Used   Substance and Sexual Activity    Alcohol use: Yes     Frequency: Monthly or less     Comment: socailly     Drug use: Never    Sexual activity: Yes     Partners: Male   Lifestyle    Physical activity:     Days per week: Not on file     Minutes per session: Not on file    Stress: Not on file   Relationships    Social connections:     Talks on phone: Not on file     Gets together: Not on file     Attends Taoism service: Not on file     Active member of club or organization: Not on file     Attends meetings of clubs or organizations: Not on file     Relationship status: Not on file    Intimate partner violence:     Fear of current or ex partner: Not on file     Emotionally abused: Not on file     Physically abused: Not on file     Forced sexual activity: Not on file   Other Topics Concern    Not on file   Social History Narrative    , no children    Works as teacher at [employer] History   Problem Relation Age of Onset    Hypertension Mother     Hyperlipidemia Father     Breast cancer Maternal Grandmother     Diabetes Paternal Grandfather     Cancer Paternal Grandfather     No Known Problems Brother        Medications:    Current Outpatient Medications:     medroxyPROGESTERone (DEPO-PROVERA) 150 mg/mL injection, Inject 150 mg into a muscle, Disp: , Rfl:     triamcinolone (KENALOG) 0 1 % cream, Apply topically 2 (two) times a day (Patient not taking: Reported on 1/13/2020), Disp: 30 g, Rfl: 0    Patient Active Problem List   Diagnosis    Psoriasis    Pain of right hand    Closed nondisplaced fracture of shaft of fifth metacarpal bone of right hand       Objective:  /84 (BP Location: Left arm, Patient Position: Sitting, Cuff Size: Adult)   Ht 5' 4" (1 626 m)   Wt 59 4 kg (131 lb)   BMI 22 49 kg/m²       Ortho Exam    Physical Exam   Constitutional: She is oriented to person, place, and time  She appears well-developed and well-nourished  HENT:   Head: Normocephalic and atraumatic  Eyes: Pupils are equal, round, and reactive to light  Conjunctivae and EOM are normal    Neck: Normal range of motion  Neck supple  Cardiovascular: Normal rate and intact distal pulses  Pulmonary/Chest: Effort normal  No respiratory distress  Neurological: She is alert and oriented to person, place, and time  She has a normal Finger-Nose-Finger Test, a normal Romberg Test and a normal Tandem Gait Test  Gait normal    Skin: Skin is warm and dry  Psychiatric: She has a normal mood and affect  Her behavior is normal    Vitals reviewed  C spine nontender to palpation      Neurologic Exam     Mental Status   Oriented to person, place, and time  Level of consciousness: alert  No nystagmus  Nl gait, tandem gait and tandem in reverse up  Nl Convergence  Cerebellar intact     Cranial Nerves   Cranial nerves II through XII intact  CN III, IV, VI   Pupils are equal, round, and reactive to light  Extraocular motions are normal      Motor Exam   Muscle bulk: normal  Overall muscle tone: normal    Sensory Exam   Light touch normal      Gait, Coordination, and Reflexes     Gait  Gait: normal    Coordination   Romberg: negative  Finger to nose coordination: normal  Tandem walking coordination: normal        There are no pertinent studies obtained with regards to today's office visit

## 2020-01-20 NOTE — PATIENT INSTRUCTIONS
You may use Advil (ibuprofen) 600mg every 6 hours OR Aleve (naproxen) 250-500mg every 12 hours as needed for pain and inflammation  You may also take Tylenol 500mg every 4-6 hours as needed  Check with your primary care physician to see if these medications are safe to take and to make sure they do not interfere with your other medications and medical issues  Try to limit headache medications to 3-4 days per week if possible to prevent rebound headaches  Concussion   AMBULATORY CARE:   A concussion  is a mild brain injury  It is usually caused by a bump or blow to the head from a fall, a motor vehicle crash, or a sports injury  Sometimes being forcefully shaken may cause a concussion  Common symptoms include the following:  Symptoms may occur right away, or they may appear days after the concussion  After the injury, you may have any of these symptoms:  · A mild to moderate headache    · Dizziness, loss of balance, or blurry vision    · Nausea or vomiting     · A change in mood, such as restlessness or irritability    · Trouble thinking, remembering things, or concentrating    · Ringing in the ears    · Drowsiness or decreased energy    · Changes in your normal sleeping pattern  Have someone else call 911 for the following:   · Someone tries to wake you and cannot do so  · You have a seizure, increasing confusion, or a change in personality  · Your speech becomes slurred, or you have new vision problems  Seek care immediately if:   · You have a severe headache that does not go away  · Someone tries to wake you and cannot do so  · You have a seizure, increasing confusion, or a change in personality  · Your speech becomes slurred, or you have new vision problems  · You have arm or leg weakness, numbness, or new problems with coordination  · You have blood or clear fluid coming out of the ears or nose    Contact your healthcare provider if:   · You have nausea or are vomiting  · You feel more sleepy than usual     · Your symptoms get worse  · Your symptoms last longer than 6 weeks after the injury  · You have questions or concerns about your condition or care  Manage a concussion:  Usually no treatment is needed for a mild concussion  Concussion symptoms usually go away within about 10 days  The following may be recommended to manage your symptoms:  · Rest  from physical and mental activities as directed  Mental activities are those that require thinking, concentration, and attention  You will need to rest until your symptoms are gone  Rest will allow you to recover from your concussion  Ask your healthcare provider when you can return to work and other daily activities  · Have someone stay with you for the first 24 hours after your injury  Your healthcare provider should be contacted if your symptoms get worse, or you develop new symptoms  · Do not participate in sports and physical activities until your healthcare provider says it is okay  They could make your symptoms worse or lead to another concussion  Your healthcare provider will tell you when it is okay for you to return to sports or physical activities  · Acetaminophen  helps to decrease pain  It is available without a doctor's order  Ask how much to take and how often to take it  Follow directions  Acetaminophen can cause liver damage if not taken correctly  · NSAIDs , such as ibuprofen, help decrease swelling and pain  NSAIDs can cause stomach bleeding or kidney problems in certain people  If you take blood thinner medicine, always ask your healthcare provider if NSAIDs are safe for you  Always read the medicine label and follow directions  Prevent another concussion:   · Wear protective sports equipment that fit properly  Helmets help decrease your risk of a serious brain injury   Talk to your healthcare provider about ways you can decrease your risk for a concussion if you play sports  · Wear your seat belt  every time you travel  This helps to decrease your risk of a head injury if you are in a car accident  Follow up with your healthcare provider as directed:  Write down your questions so you remember to ask them during your visits  © 2017 [address] [place] Information is for End User's use only and may not be sold, redistributed or otherwise used for commercial purposes  All illustrations and images included in CareNotes® are the copyrighted property of [employer] , [employer]  or Herib[employer] Daniel  The above information is an  only  It is not intended as medical advice for individual conditions or treatments  Talk to your doctor, nurse or pharmacist before following any medical regimen to see if it is safe and effective for you

## 2020-01-20 NOTE — LETTER
January 20, 2020     Patient: Pilar Craig   YOB: 1995   Date of Visit: 1/20/2020       To Whom it May Concern:    Clarisa Mckenzie is under my professional care  She was seen in my office on 1/20/2020  She {Return to school/sport/work:[id number]}  If you have any questions or concerns, please don't hesitate to call           Sincerely,          Marlon Albright MD        CC: No Recipients

## 2020-01-28 ENCOUNTER — OFFICE VISIT (OUTPATIENT)
Dept: OCCUPATIONAL THERAPY | Facility: CLINIC | Age: 25
End: 2020-01-28
Payer: COMMERCIAL

## 2020-01-28 DIAGNOSIS — S62.356D CLOSED NONDISPLACED FRACTURE OF SHAFT OF FIFTH METACARPAL BONE OF RIGHT HAND WITH ROUTINE HEALING, SUBSEQUENT ENCOUNTER: Primary | ICD-10-CM

## 2020-01-28 PROCEDURE — 97110 THERAPEUTIC EXERCISES: CPT

## 2020-01-28 PROCEDURE — 97140 MANUAL THERAPY 1/> REGIONS: CPT

## 2020-01-28 PROCEDURE — 97022 WHIRLPOOL THERAPY: CPT

## 2020-01-28 NOTE — PROGRESS NOTES
Daily Note     Today's date: 2020  Patient name: Tabitha Severance  : 1995  MRN: [id number]  Referring provider: Kristene Soulier, DO  Dx:   Encounter Diagnosis     ICD-10-CM    1  Closed nondisplaced fracture of shaft of fifth metacarpal bone of right hand with routine healing, subsequent encounter [id number] [id number]                   Subjective: " I feel like I can move my wrist and fingers better than last week "      Objective: See treatment diary below      Assessment: Tolerated treatment well  Improvement noted with wrist flexion and extension, MCP and PIP flexion of all digits  Tightness with PROM was noted with digit 4 and 5  Patient would benefit from continued OT      Plan: Continue per plan of care  Precautions: no strengthening, wear ulnar gutter splint at all times            Next Dr  appt: 20, re-eval: 20  Manual   20      PROM digits and wrist  20 min  Exercise Diary  20      Active stretch hold 10 sec  MCP flex  PIP flex  DIP flex All digits    5  5  5       10      Blocking of DIP joint D4 & 5 Hold 5 sec    5 Hold 5 sec  All joints 8x      Wrist flex/ext Hold 10 sec  3 10x      UD/RD Hold 10 sec  3x 10/10      MCP ext on table  10x      Finger ABD  10x      Prayer stretch  Hold 10 sec  5x      Wrist circles  10/10                                                                                                          Modalities  20      fluidotherapy  10 min

## 2020-01-29 ENCOUNTER — OFFICE VISIT (OUTPATIENT)
Dept: OBGYN CLINIC | Facility: CLINIC | Age: 25
End: 2020-01-29

## 2020-01-29 ENCOUNTER — APPOINTMENT (OUTPATIENT)
Dept: RADIOLOGY | Facility: CLINIC | Age: 25
End: 2020-01-29
Payer: COMMERCIAL

## 2020-01-29 VITALS
WEIGHT: 128 LBS | HEIGHT: 64 IN | SYSTOLIC BLOOD PRESSURE: 118 MMHG | BODY MASS INDEX: 21.85 KG/M2 | DIASTOLIC BLOOD PRESSURE: 82 MMHG

## 2020-01-29 DIAGNOSIS — M25.531 RIGHT WRIST PAIN: ICD-10-CM

## 2020-01-29 DIAGNOSIS — S62.356D CLOSED NONDISPLACED FRACTURE OF SHAFT OF FIFTH METACARPAL BONE OF RIGHT HAND WITH ROUTINE HEALING, SUBSEQUENT ENCOUNTER: ICD-10-CM

## 2020-01-29 DIAGNOSIS — S62.356D CLOSED NONDISPLACED FRACTURE OF SHAFT OF FIFTH METACARPAL BONE OF RIGHT HAND WITH ROUTINE HEALING, SUBSEQUENT ENCOUNTER: Primary | ICD-10-CM

## 2020-01-29 PROCEDURE — 99024 POSTOP FOLLOW-UP VISIT: CPT | Performed by: ORTHOPAEDIC SURGERY

## 2020-01-29 PROCEDURE — 73130 X-RAY EXAM OF HAND: CPT

## 2020-01-29 NOTE — PROGRESS NOTES
Patient Name:  Pilar Craig  MRN:  [id number]    Assessment     1  Closed nondisplaced fracture of shaft of fifth metacarpal bone of right hand with routine healing, subsequent encounter  XR hand 3+ vw right   2  Right wrist pain         Plan     1  Discussed with patient that based on today's x-rays, her fractures considered clinically resolved  2  She can return activity as tolerated without limitations or restrictions  3  She discontinued at this time  She can contact the office should any questions or concerns arise  There is no follow-up necessary at this time  Return if symptoms worsen or fail to improve  Subjective   Pilar Craig returns for follow-up of right 5th metacarpal fracture  The patient is 9 week(s) post injury and returns for routine follow-up  Patient reports that, due to insurance, she was unable to get the custom ulnar gutter splint from occupational therapy  So she has been consistent with using the ulnar gutter [employer] rate applied at the time of last visit  She denies any subsequent bruising, swelling, numbness, tingling  She denies any pain at this time  Objective     /82 (BP Location: Left arm, Patient Position: Sitting, Cuff Size: Adult)   Ht 5' 4" (1 626 m)   Wt 58 1 kg (128 lb)   BMI 21 97 kg/m²     Examination of the right hand shows no obvious anatomical deformity  Patient is nontender to palpation over the 5th metacarpal shaft, although palpable thickening is apparent on exam   She is able to form a full closed fist without symptom exacerbation  FDS, FDP, EDM, and EC mechanisms intact  Radial, median, and ulnar motor and sensory distributions are intact  2+ distal radial pulse with brisk capillary refill to the fingers  Sensation light touch intact distally        Data Review     Attending Physician has personally reviewed pertinent imaging and/or reports in PACS, impression is as follows:    Radiographic series taken 1/29/2020 of the right hand and 5th metacarpal shows barely visible 5th metacarpal fracture line with callus formation and complete bone bridging indicating routine healing        Scribe Attestation    I,:   Guillermo Rose am acting as a scribe while in the presence of the attending physician :        I,:   Vipul Vicente personally performed the services described in this documentation    as scribed in my presence :

## 2020-02-05 ENCOUNTER — OFFICE VISIT (OUTPATIENT)
Dept: OCCUPATIONAL THERAPY | Facility: CLINIC | Age: 25
End: 2020-02-05
Payer: COMMERCIAL

## 2020-02-05 DIAGNOSIS — S62.356D CLOSED NONDISPLACED FRACTURE OF SHAFT OF FIFTH METACARPAL BONE OF RIGHT HAND WITH ROUTINE HEALING, SUBSEQUENT ENCOUNTER: Primary | ICD-10-CM

## 2020-02-05 PROCEDURE — 97110 THERAPEUTIC EXERCISES: CPT

## 2020-02-05 PROCEDURE — 97022 WHIRLPOOL THERAPY: CPT

## 2020-02-05 PROCEDURE — 97140 MANUAL THERAPY 1/> REGIONS: CPT

## 2020-02-05 NOTE — PROGRESS NOTES
Daily Note     Today's date: 2020  Patient name: Robert Waterman  : 1995  MRN: [id number]  Referring provider: Jarrod Wilson DO  Dx:   Encounter Diagnosis     ICD-10-CM    1  Closed nondisplaced fracture of shaft of fifth metacarpal bone of right hand with routine healing, subsequent encounter [id number]                   Subjective: ' I can move better '      Objective: See treatment diary below      Assessment: Tolerated treatment well  End range tightness in wrist planes and soreness along ulnar side of hand/D5  Increased ROM with pt reporting return to (I)ADL  Patient exhibited good technique with therapeutic exercises and would benefit from continued OT      Plan: Continue per plan of care  Pt scheduled for reassessment of ROM and strength next visit  Precautions:  Use as tolerated  no restriction per MD On 2020  wear ulnar gutter splint prn            Next Dr  appt: 20, re-eval: 20  Manual   20     PROM digits and wrist  20 min  10 min                                         Exercise Diary  20     Active stretch hold 10 sec  MCP flex  PIP flex  DIP flex All digits    5  5  5       10      Blocking of DIP joint D4 & 5 Hold 5 sec    5 Hold 5 sec  All joints 8x Hold 5 sec x 5 each     Wrist flex/ext Hold 10 sec  3 10x      UD/RD Hold 10 sec  3x 10/10      MCP ext on table  10x      Finger ABD  10x 10     Prayer stretch  Hold 10 sec  5x Hold 15 sec x 5     Wrist circles  10/10      Theraputty   Red  , pinches with HEP     digiflex    Ind      Red x 2/15  Yellow x 10     Flex bar  pron/sup   Red  10/10                                                                                 Modalities  20     fluidotherapy  10 min   10 min

## 2020-02-11 ENCOUNTER — APPOINTMENT (OUTPATIENT)
Dept: OCCUPATIONAL THERAPY | Facility: CLINIC | Age: 25
End: 2020-02-11
Payer: COMMERCIAL

## 2020-02-13 ENCOUNTER — OFFICE VISIT (OUTPATIENT)
Dept: OBGYN CLINIC | Facility: MEDICAL CENTER | Age: 25
End: 2020-02-13
Payer: COMMERCIAL

## 2020-02-13 VITALS
DIASTOLIC BLOOD PRESSURE: 81 MMHG | WEIGHT: 135 LBS | HEIGHT: 64 IN | SYSTOLIC BLOOD PRESSURE: 121 MMHG | HEART RATE: 80 BPM | BODY MASS INDEX: 23.05 KG/M2

## 2020-02-13 DIAGNOSIS — S06.0X0D CONCUSSION WITHOUT LOSS OF CONSCIOUSNESS, SUBSEQUENT ENCOUNTER: Primary | ICD-10-CM

## 2020-02-13 DIAGNOSIS — M54.2 NECK PAIN: ICD-10-CM

## 2020-02-13 PROCEDURE — 1036F TOBACCO NON-USER: CPT | Performed by: EMERGENCY MEDICINE

## 2020-02-13 PROCEDURE — 3008F BODY MASS INDEX DOCD: CPT | Performed by: EMERGENCY MEDICINE

## 2020-02-13 PROCEDURE — 99213 OFFICE O/P EST LOW 20 MIN: CPT | Performed by: EMERGENCY MEDICINE
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
## 2020-02-14 ENCOUNTER — EVALUATION (OUTPATIENT)
Dept: OCCUPATIONAL THERAPY | Facility: CLINIC | Age: 25
End: 2020-02-14
Payer: COMMERCIAL

## 2020-02-14 DIAGNOSIS — S62.356D CLOSED NONDISPLACED FRACTURE OF SHAFT OF FIFTH METACARPAL BONE OF RIGHT HAND WITH ROUTINE HEALING, SUBSEQUENT ENCOUNTER: Primary | ICD-10-CM

## 2020-02-14 PROCEDURE — 97110 THERAPEUTIC EXERCISES: CPT

## 2020-02-17 ENCOUNTER — EVALUATION (OUTPATIENT)
Dept: PHYSICAL THERAPY | Facility: CLINIC | Age: 25
End: 2020-02-17
Payer: COMMERCIAL

## 2020-02-17 DIAGNOSIS — S06.0X0D CONCUSSION WITHOUT LOSS OF CONSCIOUSNESS, SUBSEQUENT ENCOUNTER: Primary | ICD-10-CM

## 2020-02-17 DIAGNOSIS — M54.2 NECK PAIN: ICD-10-CM

## 2020-02-17 PROCEDURE — 97535 SELF CARE MNGMENT TRAINING: CPT | Performed by: PHYSICAL MEDICINE & REHABILITATION

## 2020-02-17 PROCEDURE — 97162 PT EVAL MOD COMPLEX 30 MIN: CPT | Performed by: PHYSICAL MEDICINE & REHABILITATION

## 2020-02-17 NOTE — PROGRESS NOTES
PT Evaluation     Today's date: 2020  Patient name: Crystal Womack  : 1995  MRN: [id number]  Referring provider: Howard Sanchez MD  Dx:   Encounter Diagnosis     ICD-10-CM    1  Concussion without loss of consciousness, subsequent encounter S06 0X0D Ambulatory referral to Physical Therapy   2  Neck pain M54 2 Ambulatory referral to Physical Therapy                  Assessment  Assessment details: Crystal Womack is a 30 y o  female presenting to outpatient physical therapy with diagnosis of Concussion without loss of consciousness, subsequent encounter and Neck pain  At this time, pt presentation/ signs/sx and physical exam appear consistent with cervicogenic HA and postural dysfunction  She demonstrates no red flag sx at this time; has an MRI this week  Patient's current impairments include neck pain and HA with ttp of B suboccipital region and over c-spine SPs (especially of upper c-spine), impaired soft tissue mobility of upper c-spine with over lengthening of deep neck flexors/anterior c-spine related to poor posture, reduced strength and mm endurance as well as altered motor control of the DNF and SCT regions, reduced postural awareness, and reduced activity tolerance  VOMS screening was negative  Balance and exertional tolerance testing TBA upcoming  Patient's present functional limitations include difficulty with ADLs with poor tolerance for sustained sitting, reliance on medication and/or modalities for pain relief, poor tolerance for work activity with neck pain/HA, and 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 [employer] with increase in sx  Patient to benefit from skilled outpatient physical therapy 2x/week for 4-6 weeks in order to reduce pain, maximize pain free range of motion, increase strength and stability, improve postural awareness, and improve functional mobility/functional activity in order to maximize return to prior level of function with reduced limitations   Thank you for your referral     Impairments: abnormal muscle firing, abnormal muscle tone, abnormal or restricted ROM, activity intolerance, impaired physical strength, lacks appropriate home exercise program, pain with function, poor posture  and poor body mechanics  Other impairment: HA     Symptom irritability: moderateUnderstanding of Dx/Px/POC: good   Prognosis: good    Goals  STGs to be achieved in 4-6 weeks:    1  Pt will report reduced neck pain and HA pain levels "at worst" by at least 2 points (0-10 scale) in order to allow improved tolerance for functional mobility and reduced reliance on medication or modalities for pain relief  2  Pt will demonstrate improved deep neck flexor (DNF) strength and muscle endurance as noted by at least 10 second improvement in DNF test of endurance from [facility] with proper form/tehnique without need for cues  3  Pt to demonstrate reduced muscle tension and tenderness to palpation of B suboccipital region and upper c-spine in order to restore soft tissue extensibility, reduced pan, and normalized ROM  4  Pt will report overall improved tolerance for sustained sitting by at least 25-50% since [facility] with overall reduced pain/HA levels and reduced fear avoidance  LTGs to be achieved in 8-12 weeks:    1  Pt will be independent with HEP, demonstrating proper technique with exercises and understanding of self progression of program without need for cueing or assistance  2  Pt will report minimized pain levels with at least 75% reduction in pain since [facility]  3  Pt will demonstrate WFL AROM and strength of c-spine and SCT region without pain/sx  4  Pt will demonstrate understanding of proper posture and impact of poor posture/sustained poor postures on patient pain/sx  5  Pt will report return to work and studying without limitations  6  FOTO will reflect score at discharge that is greater than or equal to predicted level             Plan  Patient would benefit from: skilled physical therapy  Referral necessary: No  Planned modality interventions: cryotherapy and thermotherapy: hydrocollator packs  Planned therapy interventions: manual therapy, neuromuscular re-education, patient education, postural training, self care, strengthening, stretching, therapeutic exercise, home exercise program, motor coordination training and joint mobilization  Frequency: 2x week  Duration in visits: 8  Duration in weeks: 4  Plan of Care beginning date: 2/17/2020  Plan of Care expiration date: 3/18/2020  Treatment plan discussed with: patient        Subjective Evaluation    History of Present Illness  Mechanism of injury: Pt notes initial injury 1/4/2020  Hit her central forehead against a wall in standing  No LOC  Noted immediately she had minimal sx; had a lump on her head with some local tenderness  Notes however about 2 days later, she noted onset of HA  Noted HA was in her temple region and back of her head B  Noted some light and sound sensitivity  Noted once HA began, she also noted onset of B neck pain in suboccipital region with HA  No n/t or UE radicular sx  No dizziness, nausea, visual change, or imbalance  Noted initial sleep disturbance  Pt notes she waited a few days; sx persisted so she went to urgent care; dx with concussion; referred to Dr Jennifer Valenzuela  Concussion dx confirmed; recommended conservative tx  RTD again; 2* cont'd sx; referred for MRI and referred to OPPT at this time  No other tx to date  Pt notes c/c of cont HA's  Occur everyday  Often wakes up with them and they worsen t/o the day  Notes HA can be suboccipital B as well as B frontal regions; HA are also always associated with B upper neck pain; can have HA without pain but never has neck pain without HA  No radicular sx  HA are improved with with "days she doesn't work" and resting  Ha/neck pain are increased with computer work or use of phone extended periods; generally worse with prolonged sitting   HA also worse with concentrating on her work long periods however unsure if it is from use of computer/prolonged sitting or actual concentration  Notes at times she feels she can have pain in R ear; denies any tooth pain, clicking in jaw, or pain with biting  Notes hx of anxiety  Hx of HA however "not this often" per pt  RTD in one month  MRI is this Thursday  Quality of life: good    Pain  Progression: improved (HAs depend on the day; overall improving)    Social Support  Lives with: spouse    Employment status: working (Full time teacher, also in grad school )  Hand dominance: right      Diagnostic Tests  Abnormal MRI: upcoming  Treatments  Current treatment: physical therapy  Current treatment comments: Melatonin, ibuprophen prn   Patient Goals  Patient goals for therapy: decreased pain, independence with ADLs/IADLs and return to sport/leisure activities          Objective     Concurrent Complaints  Positive for headaches, tinnitus and history of trauma  Negative for night pain, disturbed sleep, dizziness, faints, nausea/motion sickness, trouble swallowing, difficulty breathing, shortness of breath, respiratory pain and visual change    Active Range of Motion   Cervical/Thoracic Spine       Cervical    Flexion: 75 degrees   Extension: 50 degrees      Left lateral flexion: 45 degrees      Right lateral flexion: 45 degrees      Left rotation: 85 degrees  Right rotation: 80 degrees             Tests   Cervical   Negative vertical compression, lumbar distraction, Lhermitte's sign, alar ligament test and Sharp-Chantal test      Left   Negative Spurling's Test A  Right   Positive Spurling's Test A  Lumbar   Negative vertical compression  Neuro Exam:     Headaches   Patient reports headaches: Yes             Symptoms:    Dysequilibrium: No  Lightheadedness: No  Vertigo: No  Rocking or Swaying: No         Oscillopsia: No  Diplopia: No  Motion sickness: No  Floating, Swimming, Disconnected: No    Exacerbation Factors:     Bending over: No  Turning Head: No  Rolling in bed: No  Walking: No  Looking up: No  Supine to/from sitting: No  Optokinetic movement: Yes  Walking in busy environment: Yes     Concurrent Complaints:    Tinnitus:Yes, once or twice, brief  Aural Fullness:No  Known hearing loss:No  Nausea, Vomiting: No  Altered Vision: No  Poor Concentration: No  Memory Loss: No  Peripheral Neuropathy:No      Pain Assessment      Headache Frequency: daily   Duration: "all day"   Intensity:        Best:0        Worst:7        Average: 6  Location: see subjective  Exacerbating Factors:see subjective  Relieving Factors:see subjective    Cervical 5     PHYSICAL FINDINGS:    Vestibulo-Ocular Assessment:    Oculomotor ROM : WNL  Resting nystagmus: No  Gaze holding nystagmus No   Smooth pursuit Normal    Vertical Saccades:Normal  Horizontal Saccades:Normal  Near Point Convergence: Normal    Cover/Uncover/Crosscover Test: DNT    Vestibular Ocular Reflex Assessment  DNT at IE; will cont to test/assess upcoming     Positional Testing:  DNT at  2* no sx/complaints of positional vertigo at this time; will cont to assess                Balance Assessment:    Vestibular/Ocular Motor test: NT HA  0-10 Dizziness  0-10 Nausea  0-10 Fogginess  0-10 Comments   Baseline Symptoms:  3 0 0 0    Smooth Pursuits  3 0 0 0    Saccades- Horizontal   3 0 0 0    Saccades- Vertical  3 0 0 0    Convergence (Near Point)  3 0 0 0 Near point (in cm): 1  wnl  2  WNL  3  WNL   VOR- Horizontal  4 0 0 0    VOR- Vertical   4 0 0 0    Visual Motion Sensitivity Test  4 0 0 0      CRISTIAN - TBA     DGI- TBA     Exertional Tolerance:    Braxton Concussion Treadmill Test- TBA    Outcome Measures:    Post Concussion Symptom Scale: NP- FOTO       Flowsheet Rows      Most Recent Value   PT/OT G-Codes   Current Score  61   Projected Score  75             Precautions: s/p concussion, hx anxiety       Re-eval Date: 3/18    Date 2/17       Visit Count 1/?        FOTO 2/17       Pain In See IE Pain Out See IE            Manual  2/17            SOR, OA flexion and SB mobilization to tolerance, STM B suboccipitals and c-spine PVMs to tolerance (consider GIASTM)  Contract relax suboccipitals              T-spine CPA mobs                                                         Exercise Diary  2/17            UBE vs Nustep              Seated postural correction training  10'             Scapular retraction/depression 5x             Chin tucks supine             DNF training with stabilizer             DNF endurance             Prone I, T, Ys                Scap push up plus             MTPs/LTPs             Pec stretching              Prone neck extension over EOT                                                                                                                                       Modalities               prn                                          2/17 - HEP was issued and reviewed this date for above noted exercises  Pt demonstrated understanding without incident and without questions/concerns  Postural awareness at work/home was reviewed with hands on and visual and verbal cueing; fair return demo noted  Sleep hygiene was also reviewed; understanding noted  Will continue to update upcoming

## 2020-02-20 ENCOUNTER — HOSPITAL ENCOUNTER (OUTPATIENT)
Dept: MRI IMAGING | Facility: HOSPITAL | Age: 25
Discharge: HOME | End: 2020-02-20
Payer: COMMERCIAL

## 2020-02-20 DIAGNOSIS — S06.0X0D CONCUSSION WITHOUT LOSS OF CONSCIOUSNESS, SUBSEQUENT ENCOUNTER: ICD-10-CM

## 2020-02-20 DIAGNOSIS — M54.2 NECK PAIN: ICD-10-CM

## 2020-02-20 PROCEDURE — 70551 MRI BRAIN STEM W/O DYE: CPT

## 2020-02-24 ENCOUNTER — OFFICE VISIT (OUTPATIENT)
Dept: PHYSICAL THERAPY | Facility: CLINIC | Age: 25
End: 2020-02-24
Payer: COMMERCIAL

## 2020-02-24 DIAGNOSIS — M54.2 NECK PAIN: ICD-10-CM

## 2020-02-24 DIAGNOSIS — S06.0X0D CONCUSSION WITHOUT LOSS OF CONSCIOUSNESS, SUBSEQUENT ENCOUNTER: Primary | ICD-10-CM

## 2020-02-24 PROCEDURE — 97110 THERAPEUTIC EXERCISES: CPT

## 2020-02-24 PROCEDURE — 97140 MANUAL THERAPY 1/> REGIONS: CPT

## 2020-02-24 NOTE — PROGRESS NOTES
Daily Note     Today's date: 2020  Patient name: Michelle Connor  : 1995  MRN: [id number]  Referring provider: Phan Lynn MD  Dx:   Encounter Diagnosis     ICD-10-CM    1  Concussion without loss of consciousness, subsequent encounter S06 0X0D    2  Neck pain M54 2        Start Time: 1600  Stop Time: 1705  Total time in clinic (min): 65 minutes    Subjective: "I have more neck pain than a HA today  It's on both side near my shoulders "      Objective: See treatment diary below      Assessment: Tolerated treatment fair  Forward rounded shoulders noted; cueing for posture throughout session  Pt noted increased UT/scapular tightness with prone arm raises and tband exercises  Increased symptoms noted at end of session; pain vs muscle soreness  Patient demonstrated fatigue post treatment and would benefit from continued PT      Plan: Continue per plan of care  Progress treatment as tolerated  Precautions: s/p concussion, hx anxiety       Re-eval Date: 3/18  Ins: 8 visits ending 3/16/20    Date       Visit Count 1/? 2      FOTO        Pain In See       Pain Out See IE  5/10          Manual        SOR, OA flexion and SB mobilization to tolerance, STM B suboccipitals and c-spine PVMs to tolerance (consider GIASTM)     Contract relax suboccipitals   25'      T-spine CPA mobs                                     Exercise Diary        UBE vs Nustep   L3 10'      Seated postural correction training  10'  10'      Scapular retraction/depression 5x  2x10/5"      Chin tucks supine  20x/5"      DNF training with stabilizer        DNF endurance        Prone I, T, Ys     2x10/5"      Scap push up plus        MTPs/LTPs  grn  2x10/3-5"      Pec stretching         Prone neck extension over EOT                                                                                     Modalities         MHP prn 10' skin checked; no adverse effects

## 2020-03-02 ENCOUNTER — OFFICE VISIT (OUTPATIENT)
Dept: PHYSICAL THERAPY | Facility: CLINIC | Age: 25
End: 2020-03-02
Payer: COMMERCIAL

## 2020-03-02 DIAGNOSIS — S06.0X0D CONCUSSION WITHOUT LOSS OF CONSCIOUSNESS, SUBSEQUENT ENCOUNTER: Primary | ICD-10-CM

## 2020-03-02 DIAGNOSIS — M54.2 NECK PAIN: ICD-10-CM

## 2020-03-02 PROCEDURE — 97110 THERAPEUTIC EXERCISES: CPT | Performed by: PHYSICAL MEDICINE & REHABILITATION

## 2020-03-02 PROCEDURE — 97112 NEUROMUSCULAR REEDUCATION: CPT | Performed by: PHYSICAL MEDICINE & REHABILITATION

## 2020-03-02 PROCEDURE — 97140 MANUAL THERAPY 1/> REGIONS: CPT | Performed by: PHYSICAL MEDICINE & REHABILITATION

## 2020-03-09 ENCOUNTER — OFFICE VISIT (OUTPATIENT)
Dept: PHYSICAL THERAPY | Facility: CLINIC | Age: 25
End: 2020-03-09
Payer: COMMERCIAL

## 2020-03-09 DIAGNOSIS — M54.2 NECK PAIN: ICD-10-CM

## 2020-03-09 DIAGNOSIS — S06.0X0D CONCUSSION WITHOUT LOSS OF CONSCIOUSNESS, SUBSEQUENT ENCOUNTER: Primary | ICD-10-CM

## 2020-03-09 PROCEDURE — 97110 THERAPEUTIC EXERCISES: CPT

## 2020-03-09 PROCEDURE — 97112 NEUROMUSCULAR REEDUCATION: CPT

## 2020-03-09 PROCEDURE — 97140 MANUAL THERAPY 1/> REGIONS: CPT

## 2020-03-09 NOTE — PROGRESS NOTES
Daily Note     Today's date: 3/9/2020  Patient name: Tim Hugo  : 1995  MRN: [id number]  Referring provider: Keon Meléndez MD  Dx:   Encounter Diagnosis     ICD-10-CM    1  Concussion without loss of consciousness, subsequent encounter S06 0X0D    2  Neck pain M54 2        Start Time: 1545  Stop Time: 1645  Total time in clinic (min): 60 minutes    Subjective: "I am having more neck pain and higher HA today  It was bad over the weekend  It seems to have gotten worse as the bad went on today "      Objective: See treatment diary below      Assessment: Tolerated treatment well  Pt noted with tenderness bilateral UTs into occiput  Trial of GIASTM to area  Appropriate redness noted to area, no bruising  Improved cervical motor control noted this date  Pt noted no pain at end of session  Will continue to monitor symptoms  Pt RTD 3/16/2020  Patient demonstrated fatigue post treatment and would benefit from continued PT      Plan: Continue per plan of care  Progress treatment as tolerated  Precautions: s/p concussion, hx anxiety       Re-eval Date: 3/18  Ins: 8 visits ending 3/16/20    Date 2/17 2/24 3/2 3/9    Visit Count 1/? 2 3 4    FOTO        Pain In See IE 4/10 5/10 4-5    Pain Out See IE  5/10 4/10 0        Manual  2/17 2/24 3/2 3/9    SOR, OA flexion and SB mobilization to tolerance, STM B suboccipitals and c-spine PVMs to tolerance (consider GIASTM)     Contract relax suboccipitals   25' 20'     Consider GIASTM upcoming  Trial of GIASTM 10' using #4 scanning, sweeping, and fanning to bilateral UT and occiput area    T-spine CPA mobs    NP                                 Exercise Diary  2/17 2/24 3/2 3/9    UBE vs Nustep   L3 10' UBE alt 100rpm  10' total  UBE alt 100rpm  10' total    Seated postural correction training  10'  10' reviewed     Scapular retraction/depression 5x  2x10/5" reviewed     Chin tucks supine  20x/5" 10x/5" min cues 20x/10"  cueing    DNF training with stabilizer   10x/10" grey   Cues  20x/10" grey   Cues     DNF endurance    NV    Prone I, T, Ys     2x10/5" 2x10/5-10"ea cues  2x10/5-10"ea cues     Scap push up plus        MTPs/LTPs  grn  2x10/3-5" grn  2x10/3-5"ea grn  3x10/3-5"ea    Pec stretching         Prone neck extension over EOT            UT stretch   4x30"    Levator scap stretch  4x30"  UT stretch   4x30"    Levator scap stretch  4x30"                                                                         Modalities   2/24 3/2 3/9    MHP prn 10' skin checked; no adverse effects 10' MHP c-spine- skin checked; no adverse effects 10' MHP c-spine- skin checked; no adverse effects

## 2020-03-16 ENCOUNTER — TELEPHONE (OUTPATIENT)
Dept: OBGYN CLINIC | Facility: CLINIC | Age: 25
End: 2020-03-16

## 2020-03-16 ENCOUNTER — OFFICE VISIT (OUTPATIENT)
Dept: PHYSICAL THERAPY | Facility: CLINIC | Age: 25
End: 2020-03-16
Payer: COMMERCIAL

## 2020-03-16 DIAGNOSIS — S06.0X0D CONCUSSION WITHOUT LOSS OF CONSCIOUSNESS, SUBSEQUENT ENCOUNTER: Primary | ICD-10-CM

## 2020-03-16 DIAGNOSIS — M54.2 NECK PAIN: ICD-10-CM

## 2020-03-16 PROCEDURE — 97112 NEUROMUSCULAR REEDUCATION: CPT | Performed by: PHYSICAL MEDICINE & REHABILITATION

## 2020-03-16 PROCEDURE — 97140 MANUAL THERAPY 1/> REGIONS: CPT | Performed by: PHYSICAL MEDICINE & REHABILITATION

## 2020-03-16 PROCEDURE — 97110 THERAPEUTIC EXERCISES: CPT | Performed by: PHYSICAL MEDICINE & REHABILITATION

## 2020-03-16 NOTE — PROGRESS NOTES
PT Re-Evaluation     Today's date: 3/16/2020  Patient name: Zoya Jolly  : 1995  MRN: [id number]  Referring provider: Joel Torres MD  Dx:   Encounter Diagnosis     ICD-10-CM    1  Concussion without loss of consciousness, subsequent encounter S06 0X0D    2  Neck pain M54 2                   Assessment  Assessment details: Zoya Jolly is a 30 y o  female presenting to outpatient physical therapy with diagnosis of Concussion without loss of consciousness, subsequent encounter and Neck pain  Pt has been compliant with OPPT to date, attending 5 total tx sessions to date  Overall she is reporting progress with reduced neck pain and reduced Has  Notes HA is longer constant and sx dont last as long  Pt demonstrates improvements with increased neck ROM with reduced pain/sx, reduced neck ttp with assessment with palpation, and reduced sx irritability  Improvements noted in DNF motor control and DNF and SCT mm endurance since SOC  Postural awareness is improving  She demonstrates cont'd trigger of HA/neck pain with stress/tension and sustained postures  She demonstrates cont'd tension/tighntess L UT/SCM region with cont'd endurance deficits of the SCT and DNF regions  She demonstrates cont'd increased faulty posture with reduced motor control and mm endurance as noted which cont to impact her neck pain and HA  Patient to benefit from cont'd  skilled outpatient physical therapy 1-2x/week for 4-6 weeks in order to reduce pain, maximize pain free range of motion, increase strength and stability, improve postural awareness, and improve functional mobility/functional activity in order to maximize return to prior level of function with reduced limitations   Thank you for your referral     Impairments: abnormal muscle firing, abnormal muscle tone, abnormal or restricted ROM, activity intolerance, impaired physical strength, lacks appropriate home exercise program, pain with function, poor posture  and poor body mechanics  Other impairment: HA     Symptom irritability: moderateUnderstanding of Dx/Px/POC: good   Prognosis: good    Goals  STGs to be achieved in 4-6 weeks:    1  Pt will report reduced neck pain and HA pain levels "at worst" by at least 2 points (0-10 scale) in order to allow improved tolerance for functional mobility and reduced reliance on medication or modalities for pain relief  - met   2  Pt will demonstrate improved deep neck flexor (DNF) strength and muscle endurance as noted by at least 10 second improvement in DNF test of endurance from [facility] with proper form/tehnique without need for cues  - progressing   3  Pt to demonstrate reduced muscle tension and tenderness to palpation of B suboccipital region and upper c-spine in order to restore soft tissue extensibility, reduced pan, and normalized ROM  - progressing, continues on L   4  Pt will report overall improved tolerance for sustained sitting by at least 25-50% since [facility] with overall reduced pain/HA levels and reduced fear avoidance  - progressing     LTGs to be achieved in 8-12 weeks:    1  Pt will be independent with HEP, demonstrating proper technique with exercises and understanding of self progression of program without need for cueing or assistance  - met   2  Pt will report minimized pain levels with at least 75% reduction in pain since [facility]  - progressing   3  Pt will demonstrate WFL AROM and strength of c-spine and SCT region without pain/sx  - progressing   4  Pt will demonstrate understanding of proper posture and impact of poor posture/sustained poor postures on patient pain/sx  - met   5  Pt will report return to work and studying without limitations  - progressing   6  FOTO will reflect score at discharge that is greater than or equal to predicted level   - progressing           Plan  Patient would benefit from: skilled physical therapy  Referral necessary: No  Planned modality interventions: cryotherapy and thermotherapy: hydrocollator packs  Planned therapy interventions: manual therapy, neuromuscular re-education, patient education, postural training, self care, strengthening, stretching, therapeutic exercise, home exercise program, motor coordination training and joint mobilization  Frequency: 2x week  Duration in visits: 8  Duration in weeks: 4  Plan of Care beginning date: 3/16/2020  Plan of Care expiration date: 4/15/2020  Treatment plan discussed with: patient        Subjective Evaluation    History of Present Illness  Mechanism of injury: Pt notes overall she is improving since [facility] with PT  She notes reduced neck pain  She also notes her Has "dont hurt all over" anymore and "don't last as long"  Notes HA are no longer constant either; seem to come/go  HA can occur without neck pain however when she has bad neck pain, she typically has a HA  No new sx/complaints  Felt better after GIASTM initiated last tx  She notes c/c of intermittent neck pain and Has; 1* triggered by stress/tension  Also worsened by sitting long periods/static postures; improving with improvements in postural awareness and changing positions frequently  Unsure when she returns to MD as her f/u appt for today was cancelled  Quality of life: good    Pain  Current pain ratin  At best pain ratin  At worst pain ratin  Location: Neck pain posterior c-spine B, UTs   Quality: dull ache and tight  Aggravating factors: sitting  Progression: improved (HAs depend on the day; overall improving)    Social Support  Lives with: spouse    Employment status: working (Full time teacher, also in grad school )  Hand dominance: right      Diagnostic Tests  Abnormal MRI: upcoming  Treatments  Current treatment: physical therapy  Current treatment comments: Melatonin, ibuprophen prn        Patient Goals  Patient goals for therapy: decreased pain, independence with ADLs/IADLs and return to sport/leisure activities          Objective     Concurrent Complaints  Positive for headaches and history of trauma  Negative for night pain, disturbed sleep, dizziness, faints, nausea/motion sickness, tinnitus, trouble swallowing, difficulty breathing, shortness of breath, respiratory pain and visual change    Postural Observations  Seated posture: fair  Standing posture: fair  Correction of posture: makes symptoms better    Additional Postural Observation Details  Forward head/rounded shoulders- mild and improving since [facility]  Increased thoracic kyphosis - improving   B scapular depression and protraction with mild winging - improving       Tenderness     Additional Tenderness Details  Mild ttp B suboccipital region, L >R  Mild ttp L SCM/scalenes and L UT> R  Continues with increased tension R/L UT mm that is gradually improving     Neurological Testing     Sensation   Cervical/Thoracic   Left   Intact: light touch    Right   Intact: light touch    Active Range of Motion   Cervical/Thoracic Spine       Cervical    Flexion: 75 degrees   Extension: 65 degrees      Left lateral flexion: 50 degrees      Right lateral flexion: 50 degrees      Left rotation: 80 degrees  Right rotation: 85 (Min discomfort L SCM region at end range ) degrees    with pain    Additional Active Range of Motion Details  NO change in neck pain/HA with or following AROM assessment     Strength/Myotome Testing     Additional Strength Details  B UE strength grossly WNL without pain/sx     Continues with weakness and reduced mm endurance B MT/LT mms however is improving with exercise program     Tests   Cervical   Negative vertical compression, lumbar distraction, Lhermitte's sign, alar ligament test and Sharp-Chantal test      Left   Negative Spurling's Test A  Right   Positive Spurling's Test A  Lumbar   Negative vertical compression  Neuro Exam:     Headaches   Patient reports headaches: Yes             Symptoms:    Dysequilibrium: No  Lightheadedness: No  Vertigo: No  Rocking or Swaying: No         Oscillopsia: No  Diplopia: No  Motion sickness: No  Floating, Swimming, Disconnected: No    Exacerbation Factors:     Bending over: No  Turning Head: No  Rolling in bed: No  Walking: No  Looking up: No  Supine to/from sitting: No  Optokinetic movement: Yes  Walking in busy environment: Yes     Concurrent Complaints:    Tinnitus:no  Aural Fullness:No  Known hearing loss:No  Nausea, Vomiting: No  Altered Vision: No  Poor Concentration: No  Memory Loss: No  Peripheral Neuropathy:No      Pain Assessment      Headache Frequency: intermittent   Duration:varies    Intensity:        Best:0        Worst:6        Average: 3-4  Location: see subjective  Exacerbating Factors:see subjective  Relieving Factors:see subjective    Cervical 3     PHYSICAL FINDINGS:    Vestibulo-Ocular Assessment:    Oculomotor ROM : WNL  Resting nystagmus: No  Gaze holding nystagmus No   Smooth pursuit Normal    Vertical Saccades:Normal  Horizontal Saccades:Normal  Near Point Convergence: Normal    Cover/Uncover/Crosscover Test: DNT    Vestibular Ocular Reflex Assessment  DNT at IE; will cont to test/assess upcoming     Positional Testing:  DNT at IE 2* no sx/complaints of positional vertigo at this time; will cont to assess                Balance Assessment:    Vestibular/Ocular Motor test: NT HA  0-10 Dizziness  0-10 Nausea  0-10 Fogginess  0-10 Comments   Baseline Symptoms:  1 0 0 0    Smooth Pursuits  1 0 0 0    Saccades- Horizontal   1 0 0 0    Saccades- Vertical  1 0 0 0    Convergence (Near Point)  1 0 0 0 Near point (in cm): 1  wnl  2  WNL  3   WNL   VOR- Horizontal  1 0 0 0    VOR- Vertical   1 0 0 0    Visual Motion Sensitivity Test  1 0 0 0      CRISTIAN - WFL     DGI- NT     Exertional Tolerance:    Hardin Concussion Treadmill Test- WNL    Outcome Measures:    Post Concussion Symptom Scale: NP- FOTO               Re-eval Date: 3/18  Ins: 8 visits ending 3/16/20    Date 2/17 2/24 3/2 3/9 3/16   Visit Count 1/? 2 3 4 5   FOTO 2/17       Pain In See IE 4/10 5/10 4-5 2-3/10   Pain Out See IE  5/10 4/10 0 1-2/10       Manual  2/17 2/24 3/2 3/9 3/16   SOR, OA flexion and SB mobilization to tolerance, STM B suboccipitals and c-spine PVMs to tolerance (consider GIASTM)     Contract relax suboccipitals   25' 20'     Consider GIASTM upcoming  Trial of GIASTM 10' using #4 scanning, sweeping, and fanning to bilateral UT and occiput area GIASTM 10' using #4 scanning, sweeping, and fanning to bilateral UT and occiput area   T-spine CPA mobs    NP                                 Exercise Diary  2/17 2/24 3/2 3/9 3/16   UBE vs Nustep   L3 10' UBE alt 100rpm  10' total  UBE alt 100rpm  10' total 90 rpm alt  10' total    Seated postural correction training  10'  10' reviewed     Scapular retraction/depression 5x  2x10/5" reviewed     Chin tucks supine  20x/5" 10x/5" min cues 20x/10"  cueing    DNF training with stabilizer   10x/10" grey   Cues  20x/10" grey   Cues  20x/10" grey   Cues    DNF endurance    NV 5x 10-15" cues    Prone I, T, Ys     2x10/5" 2x10/5-10"ea cues  2x10/5-10"ea cues  2x10/10-15"ea cues    Scap push up plus        MTPs/LTPs  grn  2x10/3-5" grn  2x10/3-5"ea grn  3x10/3-5"ea    Pec stretching         Prone neck extension over EOT            UT stretch   4x30"    Levator scap stretch  4x30"  UT stretch   4x30"    Levator scap stretch  4x30"  UT stretch   4x30"    Levator scap stretch  4x30"                                                                       Modalities   2/24 3/2 3/9    MHP prn 10' skin checked; no adverse effects 10' MHP c-spine- skin checked; no adverse effects 10' MHP c-spine- skin checked; no adverse effects NP

## 2020-03-20 ENCOUNTER — TELEPHONE (OUTPATIENT)
Dept: OBGYN CLINIC | Facility: CLINIC | Age: 25
End: 2020-03-20

## 2020-03-20 NOTE — TELEPHONE ENCOUNTER
Spoke to patient and canceled her appointment for 3/23  Advised her to go to ER or urgent care if her concussion symptoms would change or get worse  Patient stated she is feeling ok right now  Told patient we will be calling her to reschedule when appointments become available

## 2020-03-23 ENCOUNTER — OFFICE VISIT (OUTPATIENT)
Dept: PHYSICAL THERAPY | Facility: CLINIC | Age: 25
End: 2020-03-23
Payer: COMMERCIAL

## 2020-03-23 DIAGNOSIS — S06.0X0D CONCUSSION WITHOUT LOSS OF CONSCIOUSNESS, SUBSEQUENT ENCOUNTER: Primary | ICD-10-CM

## 2020-03-23 DIAGNOSIS — M54.2 NECK PAIN: ICD-10-CM

## 2020-03-23 PROCEDURE — 97110 THERAPEUTIC EXERCISES: CPT

## 2020-03-23 PROCEDURE — 97140 MANUAL THERAPY 1/> REGIONS: CPT

## 2020-03-23 NOTE — PROGRESS NOTES
Daily Note     Today's date: 3/23/2020  Patient name: Jeb Whitaker  : 1995  MRN: [id number]  Referring provider: Tiffany Cornejo MD  Dx:   Encounter Diagnosis     ICD-10-CM    1  Concussion without loss of consciousness, subsequent encounter S06 0X0D    2  Neck pain M54 2        Start Time: 1300  Stop Time: 1410  Total time in clinic (min): 70 minutes    Subjective: "I turned my head this morning and got shooting pain down my neck into my shoulders  I still have the pain "      Objective: See treatment diary below      Assessment: Tolerated treatment well  Pt able to complete all exercises without change in symptoms  Focused GIASTM at [facility] into UTs bilaterally  Pt noted decrease in pain levels at end of session  Reviewed posture throughout session  Patient demonstrated fatigue post treatment and would benefit from continued PT      Plan: Continue per plan of care  Progress treatment as tolerated  Re-eval Date: 3/18  Ins: 8 visits ending 20    Date 3/23       Visit Count        FOTO 3/23       Pain In 5/10       Pain Out 3/10           Manual  3/23       SOR, OA flexion and SB mobilization to tolerance, STM B suboccipitals and c-spine PVMs to tolerance (consider GIASTM)     Contract relax suboccipitals  GIASTM 10' using #4 scanning, sweeping, and fanning to bilateral UT and occiput area       T-spine CPA mobs                                     Exercise Diary  3/23       UBE vs Nustep  90 rpm alt  10' total        Seated postural correction training         Scapular retraction/depression        Chin tucks supine        DNF training with stabilizer 20x/10" grey   Cues       DNF endurance 5x 10-15" cues        Prone I, T, Ys    2x10/10-15"ea cues        Scap push up plus        MTPs/LTPs grn  3x10/3-5"ea       Pec stretching         Prone neck extension over EOT          UT stretch   4x30"    Levator scap stretch  4x30" Modalities  3/23       P 10' MHP c-spine- skin checked; no adverse effects

## 2020-03-30 ENCOUNTER — APPOINTMENT (OUTPATIENT)
Dept: PHYSICAL THERAPY | Facility: CLINIC | Age: 25
End: 2020-03-30
Payer: COMMERCIAL

## 2020-04-02 ENCOUNTER — OFFICE VISIT (OUTPATIENT)
Dept: PHYSICAL THERAPY | Facility: CLINIC | Age: 25
End: 2020-04-02
Payer: COMMERCIAL

## 2020-04-02 DIAGNOSIS — S06.0X0D CONCUSSION WITHOUT LOSS OF CONSCIOUSNESS, SUBSEQUENT ENCOUNTER: Primary | ICD-10-CM

## 2020-04-02 DIAGNOSIS — M54.2 NECK PAIN: ICD-10-CM

## 2020-04-02 PROCEDURE — 97140 MANUAL THERAPY 1/> REGIONS: CPT | Performed by: PHYSICAL MEDICINE & REHABILITATION

## 2020-04-02 PROCEDURE — 97112 NEUROMUSCULAR REEDUCATION: CPT | Performed by: PHYSICAL MEDICINE & REHABILITATION

## 2020-04-02 PROCEDURE — 97110 THERAPEUTIC EXERCISES: CPT | Performed by: PHYSICAL MEDICINE & REHABILITATION

## 2020-07-07 DIAGNOSIS — S06.0X0D CONCUSSION WITHOUT LOSS OF CONSCIOUSNESS, SUBSEQUENT ENCOUNTER: Primary | ICD-10-CM

## 2020-07-07 DIAGNOSIS — M54.2 NECK PAIN: ICD-10-CM

## 2020-07-13 ENCOUNTER — EVALUATION (OUTPATIENT)
Dept: PHYSICAL THERAPY | Facility: CLINIC | Age: 25
End: 2020-07-13
Payer: COMMERCIAL

## 2020-07-13 DIAGNOSIS — M54.2 NECK PAIN: ICD-10-CM

## 2020-07-13 DIAGNOSIS — S06.0X0D CONCUSSION, MENTAL CONFUSION OR DISORIENTATION NO LOSS CONSCIOUS, SUBSEQUENT ENCOUNTER: Primary | ICD-10-CM

## 2020-07-13 PROCEDURE — 97110 THERAPEUTIC EXERCISES: CPT | Performed by: PHYSICAL MEDICINE & REHABILITATION

## 2020-07-13 PROCEDURE — 97112 NEUROMUSCULAR REEDUCATION: CPT | Performed by: PHYSICAL MEDICINE & REHABILITATION

## 2020-07-13 PROCEDURE — 97140 MANUAL THERAPY 1/> REGIONS: CPT | Performed by: PHYSICAL MEDICINE & REHABILITATION

## 2020-07-13 NOTE — PROGRESS NOTES
PT Re-Evaluation     Today's date: 2020  Patient name: Meliton Kelly  : 1995  MRN: [id number]  Referring provider: Daphne Hernandez MD  Dx:   Encounter Diagnosis     ICD-10-CM    1  Concussion, mental confusion or disorientation no loss conscious, subsequent encounter S06 0X0D    2  Neck pain M54 2                   Assessment  Assessment details: Meliton Kelly is a 30 y o  Female returning to outpatient physical therapy with diagnosis of Concussion without loss of consciousness, subsequent encounter and Neck pain  She was recently discharged from PT 2* lack of attendance 2* COVID 19 pandemic  She overall reports that her sx had been making progress prior to hold of PT; was unable to maintain consistency with HEP and notes her sx have since returned and worsened  She has maintained previous improvements in DNF motor control, strength and endruance  However, she continues to demonstrate deficits in end range c-spine ROM with c/o pain/tighntess, motor control and strength deficits of the SCT region, tenderness and tension of the upper c-spine musculature, and signs/sx consistent with possible associated R TMJ syndrome  Hep was reviewed with no questions/concerns  Reports reduction in sx and tightness after manual therapy  Pt was educated that she may benefit from trialing an OTC nigh guard 2* c/o increased general tension and waking up with headaches with sx as R TMJ as noted; will monitor  Patient to benefit from cont'd  skilled outpatient physical therapy 1-2x/week for 4-6 weeks in order to reduce pain, maximize pain free range of motion, increase strength and stability, improve postural awareness, and improve functional mobility/functional activity in order to maximize return to prior level of function with reduced limitations   Thank you for your referral     Impairments: abnormal muscle firing, abnormal muscle tone, abnormal or restricted ROM, activity intolerance, impaired physical strength, lacks appropriate home exercise program, pain with function, poor posture  and poor body mechanics  Other impairment: HA     Symptom irritability: moderateUnderstanding of Dx/Px/POC: good   Prognosis: good    Goals  STGs to be achieved in 4-6 weeks:    1  Pt will report reduced neck pain and HA pain levels "at worst" by at least 2 points (0-10 scale) in order to allow improved tolerance for functional mobility and reduced reliance on medication or modalities for pain relief  2  Pt will demonstrate improved deep neck flexor (DNF) strength and muscle endurance as noted by at least 10 second improvement in DNF test of endurance from [facility] with proper form/tehnique without need for cues  3  Pt to demonstrate reduced muscle tension and tenderness to palpation of B suboccipital region and upper c-spine by at least 50% from [facility] in order to restore soft tissue extensibility, reduced pan, and normalized ROM  4  Pt will report overall improved tolerance for sustained sitting by at least 25-50% since [facility] with overall reduced pain/HA levels and reduced fear avoidance  LTGs to be achieved in 8-12 weeks:    1  Pt will be independent with HEP, demonstrating proper technique with exercises and understanding of self progression of program without need for cueing or assistance  2  Pt will report minimized pain levels with at least 75% reduction in pain since [facility]  3  Pt will demonstrate WFL AROM and strength of c-spine and SCT region without pain/sx  4  Pt will demonstrate understanding of proper posture and impact of poor posture/sustained poor postures on patient pain/sx  5  Pt will report return to work and studying without limitations  6  FOTO will reflect score at discharge that is greater than or equal to predicted level              Plan  Patient would benefit from: skilled physical therapy  Referral necessary: No  Planned modality interventions: cryotherapy and thermotherapy: hydrocollator packs  Planned therapy interventions: manual therapy, neuromuscular re-education, patient education, postural training, self care, strengthening, stretching, therapeutic exercise, home exercise program, motor coordination training and joint mobilization  Frequency: 2x week  Duration in visits: 8  Duration in weeks: 4  Plan of Care beginning date: 2020  Plan of Care expiration date: 2020  Treatment plan discussed with: patient        Subjective Evaluation    History of Present Illness  Mechanism of injury: Pt notes she was "doing OK", however about a month ago, her sx gradually began getting worse  Notes she had stopped performing her HEP 2* "pulling a mm in the ribs"; notes she was unable to perform prone exercises  After this, her neck pain and HA gradually worsened  Notes as her neck pain returned/worsened and then her HA worsened  No new trauma or CHRISTI per pt  No new sx at this time  Notes present c/c of nearly constant psin in the back of her neck into B upper c-spine PVMs into B UTs  Notes HA is presently associated with her neck pain  Overall pain waxes/wanes  Neck pain/HA are increased with with no known triggers per pt  Pain /HA are worse with anxiety/stress  Pain/HA are reduced with ibuprofen and heating pad  Notes at times she can have elevated HA/neck pain upon waking in the morning  No UE radicular sx; no n/t or sensory disturbance  No UE/LE weakness, no imbalance, no dizziness, no visual change, no passing out/blacking out, etc  Notes at present all post concussion sx have resolved except for neck pain/HA  No f/u with MD scheduled  Notes presently she has continued to "deal with it" and work through the pain with her ADLs  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     Quality of life: good    Pain  Current pain ratin  At best pain ratin  At worst pain ratin  Location: Neck pain posterior c-spine B, UTs   Quality: dull ache and tight  Relieving factors: heat and medications  Aggravating factors: sitting  Progression: worsening (Was improving, worsened since stopping PT)    Social Support  Lives with: spouse    Employment status: working (Full time teacher, also in grad school )  Hand dominance: right      Diagnostic Tests  Abnormal MRI: upcoming  Treatments  Current treatment: physical therapy  Current treatment comments: Melatonin, ibuprophen prn   Patient Goals  Patient goals for therapy: decreased pain, independence with ADLs/IADLs and return to sport/leisure activities          Objective     Concurrent Complaints  Positive for headaches and history of trauma  Negative for night pain, disturbed sleep, dizziness, faints, nausea/motion sickness, tinnitus, trouble swallowing, difficulty breathing, shortness of breath, respiratory pain and visual change    Additional Special Questions  No new trauma     Postural Observations  Seated posture: fair  Standing posture: fair  Correction of posture: makes symptoms better    Additional Postural Observation Details  Forward head/rounded shoulders- continues   Increased thoracic kyphosis - continues   B scapular depression and protraction with mild winging -continues       Tenderness   Cervical Spine   Tenderness in the spinous process       Additional Tenderness Details  Moderate ttp B suboccipital region, L >R  Moderate ttp L SCM/scalenes and L UT> R proximally   Continues with increased tension R/L UT mm     Moderate ttp centrally over spinous processes T1-C2/3; no radicular sx or change in HA with palpation or gentle mobilization  Will cont to assess     Neurological Testing     Sensation   Cervical/Thoracic   Left   Intact: light touch    Right   Intact: light touch    Active Range of Motion   Cervical/Thoracic Spine       Cervical    Flexion: 60 degrees   Extension: 45 degrees      Left lateral flexion: 50 degrees      Right lateral flexion: 45 ("tight" L UT region proximally at end range) degrees      Left rotation: 73 degrees  Right rotation: 75 (Min discomfort L SCM region at end range ) degrees    with pain    Additional Active Range of Motion Details  NO change in neck pain/HA with or following AROM assessment     Joint Play   Joints within functional limits: C3, C4, C5, C6 and C7     Comments: T-spine TBA    Strength/Myotome Testing     Left Shoulder     Planes of Motion   Flexion: 4+   Abduction: 4+   External rotation at 0°: 4   Internal rotation at 0°: 4+     Isolated Muscles   Lower trapezius: 3+   Middle trapezius: 4-   Upper trapezius: 4+     Right Shoulder     Planes of Motion   Flexion: 4+   Abduction: 4+   External rotation at 0°: 4   Internal rotation at 0°: 4+     Isolated Muscles   Lower trapezius: 3+   Middle trapezius: 4-   Upper trapezius: 4+     Left Elbow   Flexion: 4+  Extension: 4+    Right Elbow   Flexion: 4+  Extension: 4+    Additional Strength Details  B UE strength grossly WNL without pain/sx     Continues with weakness and reduced mm endurance B MT/LT mms however is improving with exercise program     Tests   Cervical   Positive craniocervical flexion test and neck flexor muscle endurance test   Negative vertical compression, lumbar distraction, Lhermitte's sign, alar ligament test and Sharp-Chantal test      Left   Negative cervical flexion-rotation test      Lumbar   Negative vertical compression  Additional Tests Details  Will cont to assess    Continues with mild deficits with craniocervical flexion with reduced strength with DNF with stabilizer for feedback  Mild endurance deficits with DNF endurance test= 15 seconds today with min compensation   Will cont to assess  TMJ   Joint sounds right: clicking  Opening (mm): right deviation   ROM comments: Clicking noted /palpated upon opening and closing jaw at R TMJ  Mild ttp R> L LTMJ  Lateral deviation to right noted with end range jaw opening  Mild ttp R and L temporal mms; no ttp R/L masseter mms   Will cont to assess   Neuro Exam:     Headaches   Patient reports headaches: Yes  Precautions HA, s/p concussion    Re-eval Date: 8/12  Ins:       Date 7/13       Visit Count        FOTO NV **      Pain In See RE       Pain Out SEE RE               Manuals 7/13       SOR to tolerance, MET to suboccipitals, STM B posterior c-spine/UTs/suboccpitials, gentle c-spine manual traction, UT stretch B 20'                                Neuro Re-Ed         DNF activation supine with chin tucks 10x/10"cues       DNF hold with stabilizer feedback Roa 10x/10"       Neutral/resting jaw position sitting/supine Reviewed x3'                                       Ther Ex        UBE alt 10' 60 rpm       UT stretch    Levator stretch    Scalene stretch  reviewed    Reviewed       Prone I, T, Ys      Prone rows 10x/10" ea cues       Scap push up plus        Prone   Pivot at wall - endurance        JAYY neutral c-spine hold with  cervical retraction with tband         Iron neck                 Ther Activity                        Gait Training                        Modalities prn

## 2020-07-16 ENCOUNTER — OFFICE VISIT (OUTPATIENT)
Dept: PHYSICAL THERAPY | Facility: CLINIC | Age: 25
End: 2020-07-16
Payer: COMMERCIAL

## 2020-07-16 DIAGNOSIS — M54.2 NECK PAIN: ICD-10-CM

## 2020-07-16 DIAGNOSIS — S06.0X0D CONCUSSION, MENTAL CONFUSION OR DISORIENTATION NO LOSS CONSCIOUS, SUBSEQUENT ENCOUNTER: Primary | ICD-10-CM

## 2020-07-16 PROCEDURE — 97112 NEUROMUSCULAR REEDUCATION: CPT | Performed by: PHYSICAL MEDICINE & REHABILITATION

## 2020-07-16 PROCEDURE — 97110 THERAPEUTIC EXERCISES: CPT | Performed by: PHYSICAL MEDICINE & REHABILITATION

## 2020-07-16 PROCEDURE — 97140 MANUAL THERAPY 1/> REGIONS: CPT | Performed by: PHYSICAL MEDICINE & REHABILITATION

## 2020-07-16 NOTE — PROGRESS NOTES
Daily Note     Today's date: 2020  Patient name: Aziza Coleman  : 1995  MRN: [id number]  Referring provider: Ernie Rachel MD  Dx:   Encounter Diagnosis     ICD-10-CM    1  Concussion, mental confusion or disorientation no loss conscious, subsequent encounter S06 0X0D    2  Neck pain M54 2                   Subjective: Pt notes no new sx/complaints  3/10 HA 1* today  Notes she forgot to mention she occasionally has pain around the superior bridge of her nose when she has a HA  Objective: See treatment diary below      Assessment: Tolerated treatment fair  Reported mm spasm L suboccpitial region after R UT stretch that caused slight HA on L side of her head/forehead; lingered t/o the session  Tenderness located centrally t/o c-spine with supine  supine, most notable at C2/3 region; no change in pain/HA with repeated CPA mobilizations  Reduced OA mobility noted into flexion 2* sub occipital tightness  C/o soreness B suboccipital region following chin tuck training; no change in HA  Poor mm endurance/strength noted of MT and LT mms with prone exercises  Persistent L sided neck pain/HA end of tx; reviewed self stretching and ice/heat; understanding noted  No complaints end of tx  Patient demonstrated fatigue post treatment and would benefit from continued PT      Plan: Continue per plan of care  Progress treatment as tolerated         Precautions HA, s/p concussion    Re-eval Date:   Ins:       Date       Visit Count        FOTO NV       Pain In See       Pain Out SEE RE  5/10             Manuals       SOR to tolerance, MET to suboccipitals, STM B posterior c-spine/UTs/suboccpitials, gentle c-spine manual traction, UT stretch B 20'  15'                              Neuro Re-Ed         DNF activation supine with chin tucks 10x/10"cues 2x10/10" cues       DNF hold with stabilizer feedback Roa 10x/10" Roa 10x/10"       Neutral/resting jaw position sitting/supine Reviewed x3'                                       Ther Ex        UBE alt 10' 60 rpm 10' 60 rpm      UT stretch    Levator stretch    Scalene stretch  reviewed    Reviewed 4x30"     4x30"       Prone I, T, Ys      Prone rows 10x/10" ea cues 15x/10" ea cues    NV      Scap push up plus  NV      Prone   Pivot at wall - endurance        JAYY neutral c-spine hold with  cervical retraction with tband         Iron neck                 Ther Activity                        Gait Training                        Modalities prn deferred

## 2020-07-20 ENCOUNTER — OFFICE VISIT (OUTPATIENT)
Dept: PHYSICAL THERAPY | Facility: CLINIC | Age: 25
End: 2020-07-20
Payer: COMMERCIAL

## 2020-07-20 DIAGNOSIS — M54.2 NECK PAIN: ICD-10-CM

## 2020-07-20 DIAGNOSIS — S06.0X0D CONCUSSION, MENTAL CONFUSION OR DISORIENTATION NO LOSS CONSCIOUS, SUBSEQUENT ENCOUNTER: Primary | ICD-10-CM

## 2020-07-20 PROCEDURE — 97140 MANUAL THERAPY 1/> REGIONS: CPT | Performed by: PHYSICAL MEDICINE & REHABILITATION

## 2020-07-20 PROCEDURE — 97112 NEUROMUSCULAR REEDUCATION: CPT | Performed by: PHYSICAL MEDICINE & REHABILITATION

## 2020-07-20 NOTE — PROGRESS NOTES
Daily Note     Today's date: 2020  Patient name: Marvin Ha  : 1995  MRN: [id number]  Referring provider: David Melara MD  Dx:   Encounter Diagnosis     ICD-10-CM    1  Concussion, mental confusion or disorientation no loss conscious, subsequent encounter S06 0X0D    2  Neck pain M54 2                   Subjective: Pt notes she has had increasing neck tension/pain and HA t/o the day today  No known triggers  No new sx/complaints  Notes stretching/HEP continues to reduce sx "slightly"  Objective: See treatment diary below      Assessment: Tolerated treatment well  Educated pt in proper/central resting position of jaw to induce mm relaxation of jaw mm and reduced tension/HA/sx; pt demonstrated good tolerance and understanding without incidence  Added/resumed GIASTM to posterior c-spine and suboccipital region with pt seated; tolerated well without incident; increased tension/restrictions noted L upper c-spine vs R; increased tenderness noted in this region as well  Continues with B suboccipital tightness with limited OA flexion mobility noted; improved slightly with contract relax MET  No change in sx after MT  Gradual improvements noted in mm activation and endurance of SCT region and DNF with exercises; overall continues with endurance > strength deficits  Will cont to assess R TMJ for contribution to present HA/sx upcoming  Patient demonstrated fatigue post treatment and would benefit from continued PT      Plan: Continue per plan of care  Progress treatment as tolerated         Precautions HA, s/p concussion    Re-eval Date:   Ins: 8 visit, exp 12/10      Date      Visit Count   3/8     FOTO NV       Pain In See RE 3/10 4/10     Pain Out SEE RE  5/10 2/10            Manuals      SOR to tolerance, MET to suboccipitals, STM B posterior c-spine/UTs/suboccpitials, gentle c-spine manual traction, UT stretch B 20'  15' GIASTM posterior cspine and suboccipital region B pt seated instrument #4 gentle sweeping/fanning, SOR, MET to suboccipitals to improve OA flexion, intermittent manual c-spine traction  To tolerance  25' total                              Neuro Re-Ed         DNF activation supine with chin tucks 10x/10"cues 2x10/10" cues  Reviewed HEP     DNF hold with stabilizer feedback Roa 10x/10" Roa 10x/10"  Reviewed HEP      Neutral/resting jaw position sitting/supine Reviewed x3'  4' total (with self stretching)                                     Ther Ex        UBE alt 10' 60 rpm 10' 60 rpm Nustep L 4 10'     UT stretch    Levator stretch    Scalene stretch  reviewed    Reviewed 4x30"     4x30"  4x30" ea    4x30" ea     Prone I, T, Ys      Prone rows 10x/10" ea cues 15x/10" ea cues    NV 15x/10" ea    NV     Scap push up plus  NV 1/2 plank  1x10 total     Full plank  2x5     Prone   Pivot at wall - endurance        JAYY neutral c-spine hold with  cervical retraction with tband         Iron neck                 Ther Activity                        Gait Training                        Modalities prn deferred deferred

## 2020-07-22 ENCOUNTER — OFFICE VISIT (OUTPATIENT)
Dept: PHYSICAL THERAPY | Facility: CLINIC | Age: 25
End: 2020-07-22
Payer: COMMERCIAL

## 2020-07-22 DIAGNOSIS — S06.0X0D CONCUSSION, MENTAL CONFUSION OR DISORIENTATION NO LOSS CONSCIOUS, SUBSEQUENT ENCOUNTER: Primary | ICD-10-CM

## 2020-07-22 DIAGNOSIS — M54.2 NECK PAIN: ICD-10-CM

## 2020-07-22 PROCEDURE — 97140 MANUAL THERAPY 1/> REGIONS: CPT

## 2020-07-22 PROCEDURE — 97110 THERAPEUTIC EXERCISES: CPT

## 2020-07-22 NOTE — PROGRESS NOTES
Daily Note     Today's date: 2020  Patient name: Meliton Kelly  : 1995  MRN: [id number]  Referring provider: Daphne Hernandez MD  Dx:   Encounter Diagnosis     ICD-10-CM    1  Concussion, mental confusion or disorientation no loss conscious, subsequent encounter S06 0X0D    2  Neck pain M54 2        Start Time: 1400  Stop Time: 1500  Total time in clinic (min): 60 minutes    Subjective: "I am having a bad day  I was on the computer a lot with morning and have a lot of pain in my neck today "      Objective: See treatment diary below      Assessment: Tolerated treatment well  Pt able to complete exercises without incident  Slight UT tightness noted R>L  Appropriate redness noted with GIASTM  Increased motion with decreased restrictions noted at end of session with decreased pain  Patient demonstrated fatigue post treatment and would benefit from continued PT      Plan: Continue per plan of care  Progress treatment as tolerated         Precautions HA, s/p concussion    Re-eval Date:   Ins: 8 visit, exp 12/10      Date     Visit Count   3/8 4/8    FOTO NV       Pain In See RE 3/10 4/10 5/10    Pain Out SEE RE  5/10 2/10 3/10           Manuals     SOR to tolerance, MET to suboccipitals, STM B posterior c-spine/UTs/suboccpitials, gentle c-spine manual traction, UT stretch B 20'  15' GIASTM posterior cspine and suboccipital region B pt seated instrument #4 gentle sweeping/fanning, SOR, MET to suboccipitals to improve OA flexion, intermittent manual c-spine traction  To tolerance  25' total  GIASTM posterior cspine and suboccipital region B pt seated instrument #4 gentle sweeping/fanning, SOR, MET to suboccipitals to improve OA flexion, intermittent manual c-spine traction  To tolerance  25' total                             Neuro Re-Ed         DNF activation supine with chin tucks 10x/10"cues 2x10/10" cues  Reviewed HEP     DNF hold with stabilizer feedback Roa 10x/10" Grey 10x/10"  Reviewed HEP      Neutral/resting jaw position sitting/supine Reviewed x3'  4' total (with self stretching)                                     Ther Ex        UBE alt 10' 60 rpm 10' 60 rpm Nustep L 4 10' 60 RPM  10'    UT stretch    Levator stretch    Scalene stretch  reviewed    Reviewed 4x30"     4x30"  4x30" ea    4x30" ea     Prone I, T, Ys      Prone rows 10x/10" ea cues 15x/10" ea cues    NV 15x/10" ea    NV 15x/10" ea      2x10/3-5"    Scap push up plus  NV 1/2 plank  1x10 total     Full plank  2x5     Full plank  2x5    Prone   Pivot at wall - endurance        JAYY neutral c-spine hold with  cervical retraction with tband         Iron neck                 Ther Activity                        Gait Training                        Modalities prn deferred deferred

## 2020-07-27 ENCOUNTER — OFFICE VISIT (OUTPATIENT)
Dept: PHYSICAL THERAPY | Facility: CLINIC | Age: 25
End: 2020-07-27
Payer: COMMERCIAL

## 2020-07-27 DIAGNOSIS — M54.2 NECK PAIN: ICD-10-CM

## 2020-07-27 DIAGNOSIS — S06.0X0D CONCUSSION, MENTAL CONFUSION OR DISORIENTATION NO LOSS CONSCIOUS, SUBSEQUENT ENCOUNTER: Primary | ICD-10-CM

## 2020-07-27 PROCEDURE — 97110 THERAPEUTIC EXERCISES: CPT | Performed by: PHYSICAL MEDICINE & REHABILITATION

## 2020-07-27 PROCEDURE — 97140 MANUAL THERAPY 1/> REGIONS: CPT | Performed by: PHYSICAL MEDICINE & REHABILITATION

## 2020-07-27 NOTE — PROGRESS NOTES
Daily Note     Today's date: 2020  Patient name: Sung Mills  : 1995  MRN: [id number]  Referring provider: Deshaun Mc MD  Dx:   Encounter Diagnosis     ICD-10-CM    1  Concussion, mental confusion or disorientation no loss conscious, subsequent encounter S06 0X0D    2  Neck pain M54 2                   Subjective: Pt notes overall no new sx/complaints  Continues to deny red flag sx  Notes overall her neck pain/HA "is a little better" since [facility]  Cont trigger for HA remains to be stress/tension and prolonged forward head posture  Objective: See treatment diary below      Assessment: Tolerated treatment well  Pt continues with restrictions noted R/L suboccipital region into proximal c-spine PVMs with GIASTM; reports reduced pain and perceived tightness/tension after tx  Pt demonstrated reduced upper c-spine CPA mobility with manuals; improved mobility with reduced localized tenderness after tx  Pt noted abolishment of neck pain and HA after supine manual therapy this date  Reviewed postural awareness with understanding noted and no questions/concerns  Educated pt on completing HA diary, including lists of foods/drinks and sleep quality etc, to identify any other potential triggers  Understanding/agreement noted  Reviewed Hep with no questions/concerns  Patient demonstrated fatigue post treatment and would benefit from continued PT      Plan: Continue per plan of care  Progress treatment as tolerated         Precautions HA, s/p concussion    Re-eval Date:   Ins: 8 visit, exp 12/10      Date    Visit Count   3/8 4/8 5/8   FOTO NV       Pain In See RE 3/10 4/10 5/10 3/10   Pain Out SEE RE  5/10 2/10 3/10 1/10          Manuals    SOR to tolerance, MET to suboccipitals, STM B posterior c-spine/UTs/suboccpitials, gentle c-spine manual traction, UT stretch B 20'  15' GIASTM posterior cspine and suboccipital region B pt seated instrument #4 gentle sweeping/fanning, SOR, MET to suboccipitals to improve OA flexion, intermittent manual c-spine traction  To tolerance  25' total  GIASTM posterior cspine and suboccipital region B pt seated instrument #4 gentle sweeping/fanning, SOR, MET to suboccipitals to improve OA flexion, intermittent manual c-spine traction  To tolerance  25' total  GIASTM posterior cspine and suboccipital region B pt seated instrument #4 gentle sweeping/fanning, SOR, MET to suboccipitals to improve OA flexion, intermittent manual c-spine traction  To tolerance, Supine CPA mobs upper c-spine to tolerance grades 2-4 to tolerance  30' total                            Neuro Re-Ed         DNF activation supine with chin tucks 10x/10"cues 2x10/10" cues  Reviewed HEP  HEP    DNF hold with stabilizer feedback Roa 10x/10" Roa 10x/10"  Reviewed HEP   HEP   Neutral/resting jaw position sitting/supine Reviewed x3'  4' total (with self stretching)                                     Ther Ex        UBE alt 10' 60 rpm 10' 60 rpm Nustep L 4 10' 60 RPM  10' 60 RPM  10'   UT stretch    Levator stretch    Scalene stretch  reviewed    Reviewed 4x30"     4x30"  4x30" ea    4x30" ea  HEP    HEP    Prone I, T, Ys      Prone rows 10x/10" ea cues 15x/10" ea cues    NV 15x/10" ea    NV 15x/10" ea      2x10/3-5" 15x/10" ea   Scap push up plus  NV 1/2 plank  1x10 total     Full plank  2x5     Full plank  2x5     HEP    Prone   Pivot at wall - endurance     B ER with tband with ball on wall cervical stabilization   Blue   10x/10"      JAYY neutral c-spine hold with  cervical retraction with tband         Iron neck                 Ther Activity                        Gait Training                        Modalities prn deferred deferred  deferred

## 2020-07-29 ENCOUNTER — OFFICE VISIT (OUTPATIENT)
Dept: PHYSICAL THERAPY | Facility: CLINIC | Age: 25
End: 2020-07-29
Payer: COMMERCIAL

## 2020-07-29 DIAGNOSIS — S06.0X0D CONCUSSION, MENTAL CONFUSION OR DISORIENTATION NO LOSS CONSCIOUS, SUBSEQUENT ENCOUNTER: Primary | ICD-10-CM

## 2020-07-29 DIAGNOSIS — M54.2 NECK PAIN: ICD-10-CM

## 2020-07-29 PROCEDURE — 97110 THERAPEUTIC EXERCISES: CPT | Performed by: PHYSICAL MEDICINE & REHABILITATION

## 2020-07-29 PROCEDURE — 97140 MANUAL THERAPY 1/> REGIONS: CPT | Performed by: PHYSICAL MEDICINE & REHABILITATION

## 2020-07-29 NOTE — PROGRESS NOTES
Daily Note     Today's date: 2020  Patient name: Macrin Ferraro  : 1995  MRN: [id number]  Referring provider: Janel Gonzales MD  Dx:   Encounter Diagnosis     ICD-10-CM    1  Concussion, mental confusion or disorientation no loss conscious, subsequent encounter S06 0X0D    2  Neck pain M54 2                   Subjective: Pt notes if she "moves the head around" it causes her neck to tense up which leads to a HA  No new sx/complaints  Reports relief after last tx lasted until the next day  No new sx/complaints  Objective: See treatment diary below       Assessment: Tolerated treatment well  Demonstrates full c-spine PROM without pain/sx  Negative cervical flexion/rotation test R and L  Reports cont'd tenderness at C 2-5 centrally with CPA mobs; notes reduced neck pain/HA after repeated CPA mobilizations upper c-spine, gentle SOR, and intermittent manual cervical traction  Improving endurance of the SCT and DNF noted with exercise program  Overall continues with reduced DNF endurance and SCT endurance (especially of the LT mms B) and reduced postural awareness  May benefit from addition of the Iron Neck as tolerated for additional neck strengthening and stabilization  Patient demonstrated fatigue post treatment and would benefit from continued PT      Plan: Continue per plan of care  Progress treatment as tolerated         Precautions HA, s/p concussion    Re-eval Date:   Ins: 8 visit, exp 12/10      Date        Visit Count        FOTO        Pain In 3/10       Pain Out /10              Manuals        SOR to tolerance, MET to suboccipitals, STM B posterior c-spine/UTs/suboccpitials, gentle c-spine manual traction, UT stretch B   GIASTM posterior cspine and suboccipital region B pt seated instrument #4 gentle sweeping/fanning, SOR, MET to suboccipitals to improve OA flexion, intermittent manual c-spine traction  To tolerance, Supine CPA mobs upper c-spine to tolerance grades 2-4 to tolerance  25' total                                Neuro Re-Ed         DNF activation supine with chin tucks HEP       DNF hold with stabilizer feedback HEP       Neutral/resting jaw position sitting/supine HEP                                       Ther Ex        UBE alt 10' 60 rpm  Alt        UT stretch    Levator stretch    Scalene stretch        Prone I, T, Ys      Prone rows 15x/10-15" ea       Scap push up plus Full Planks  2x10         Prone   Pivot at wall - endurance B ER with tband with ball on wall cervical stabilization   Blue   10x/10"        JAYY neutral c-spine hold with  cervical retraction with tband         Iron neck                 Ther Activity                        Gait Training                        Modalities deferred

## 2020-08-05 ENCOUNTER — OFFICE VISIT (OUTPATIENT)
Dept: PHYSICAL THERAPY | Facility: CLINIC | Age: 25
End: 2020-08-05
Payer: COMMERCIAL

## 2020-08-05 DIAGNOSIS — M54.2 NECK PAIN: Primary | ICD-10-CM

## 2020-08-05 PROCEDURE — 97140 MANUAL THERAPY 1/> REGIONS: CPT

## 2020-08-05 PROCEDURE — 97110 THERAPEUTIC EXERCISES: CPT

## 2020-08-05 NOTE — PROGRESS NOTES
Daily Note     Today's date: 2020  Patient name: Vickie Dupont  : 1995  MRN: [id number]  Referring provider: Josey Gale MD  Dx:   Encounter Diagnosis     ICD-10-CM    1  Neck pain  M54 2        Start Time: 1500  Stop Time: 1555  Total time in clinic (min): 55 minutes    Subjective: Pt  denies any pain level to report @ cervical region, however, when asked again later she says "it's a 3"  Also denies any dizziness, unsteadiness, or headaches at this present time  Objective: See treatment diary below      Assessment: Tolerated treatment Well overall with performance and tolerance to all ther exer and manual therapy applications  Pain level decreased by 2 levels by end of treatment session  Plan:  Con't services 2x/week as per POC  Precautions HA, s/p concussion    Re-eval Date:   Ins: 8 visit, exp 12/10      Date  8 5 20      Visit Count       FOTO        Pain In 3/10 3/10      Pain Out 1/10 1/10             Manuals  8 5 20      SOR to tolerance, MET to suboccipitals, STM B posterior c-spine/UTs/suboccpitials, gentle c-spine manual traction, UT stretch B   GIASTM posterior cspine and suboccipital region B pt seated instrument #4 gentle sweeping/fanning, SOR, MET to suboccipitals to improve OA flexion, intermittent manual c-spine traction  To tolerance, Supine CPA mobs upper c-spine to tolerance grades 2-4 to tolerance  25' total  IASTM posterior cspine and suboccipital region B pt seated     gentle sweeping/fanning, SOR     manual c-spine traction  To tolerance,  25 min total                                Neuro Re-Ed         DNF activation supine with chin tucks HEP       DNF hold with stabilizer feedback HEP       Neutral/resting jaw position sitting/supine HEP                                       Ther Ex  8  5 20      UBE alt 10' 60 rpm  Alt  10' 60 rpm  Alt       UT stretch    Levator stretch    Scalene stretch        Prone I, T, Ys      Prone rows 15x/10-15" ea 15x/10-15" ea      Scap push up plus Full Planks  2x10   Full Planks  2x10      Prone   Pivot at wall - endurance B ER with tband with ball on wall cervical stabilization   Blue   10x/10"  B ER with tband with ball on wall cervical stabilization   Blue   10x/10"      JAYY neutral c-spine hold with  cervical retraction with tband         Iron neck                 Ther Activity                        Gait Training                        Modalities deferred

## 2020-08-10 ENCOUNTER — EVALUATION (OUTPATIENT)
Dept: PHYSICAL THERAPY | Facility: CLINIC | Age: 25
End: 2020-08-10
Payer: COMMERCIAL

## 2020-08-10 DIAGNOSIS — S06.0X0D CONCUSSION, MENTAL CONFUSION OR DISORIENTATION NO LOSS CONSCIOUS, SUBSEQUENT ENCOUNTER: ICD-10-CM

## 2020-08-10 DIAGNOSIS — M54.2 NECK PAIN: Primary | ICD-10-CM

## 2020-08-10 PROCEDURE — 97140 MANUAL THERAPY 1/> REGIONS: CPT | Performed by: PHYSICAL MEDICINE & REHABILITATION

## 2020-08-10 PROCEDURE — 97110 THERAPEUTIC EXERCISES: CPT | Performed by: PHYSICAL MEDICINE & REHABILITATION

## 2020-08-10 NOTE — PROGRESS NOTES
PT Re-Evaluation     Today's date: 8/10/2020  Patient name: Yareli Castillo  : 1995  MRN: [id number]  Referring provider: Seven Rudd MD  Dx:   Encounter Diagnosis     ICD-10-CM    1  Neck pain  M54 2    2  Concussion, mental confusion or disorientation no loss conscious, subsequent encounter  S06 0X0D                   Assessment  Assessment details: Yareli Castillo is a 30 y o  Female returning to outpatient physical therapy with diagnosis of Concussion without loss of consciousness, subsequent encounter and Neck pain  She has been compliant with OPPT to date, attending 8 total PT sessions  She reports subjective improvements in reducing HA frequency to 1x/week and overall reduced HA intensity  Notes manual tx and neck stretching helps to reduce her pain and HA  Notes cont'd increase in neck pain and HA with seated/computer work  No new sx/complaints  She has demonstrated improvements in neck AROM with reduced pain, improving postural awareness, improving motor control and mm recruitment of the DNF, and improving endurance of the DNF and scapulothoracic regions  She demonstrates persistent mm tension/tighntess of the L suboccipital region> R with 1* muscular sx/complaints at present, cont'd postural deficits, and cont'd reduced mm endurance of the DNF and SCT regions which can all contribute towards cont'd HA  She has no complaints of TMJ at this time  She denies visual change or disturbance, however 2* elevated HA 1* with with computer use, she may benefit from f/u with her eye doctor for regular examination; pt noted understanding  Patient to benefit from cont'd  skilled outpatient physical therapy 1-2x/week for 4-6 weeks in order to reduce pain, maximize pain free range of motion, increase strength and stability, improve postural awareness, and improve functional mobility/functional activity in order to maximize return to prior level of function with reduced limitations   Thank you for your referral     Impairments: abnormal muscle tone, abnormal or restricted ROM, activity intolerance, impaired physical strength, pain with function, poor posture  and poor body mechanics  Other impairment: HA     Symptom irritability: moderateUnderstanding of Dx/Px/POC: good   Prognosis: good    Goals  STGs to be achieved in 4-6 weeks:    1  Pt will report reduced neck pain and HA pain levels "at worst" by at least 2 points (0-10 scale) in order to allow improved tolerance for functional mobility and reduced reliance on medication or modalities for pain relief  - met   2  Pt will demonstrate improved deep neck flexor (DNF) strength and muscle endurance as noted by at least 10 second improvement in DNF test of endurance from [facility] with proper form/tehnique without need for cues  - progressing   3  Pt to demonstrate reduced muscle tension and tenderness to palpation of B suboccipital region and upper c-spine by at least 50% from [facility] in order to restore soft tissue extensibility, reduced pan, and normalized ROM  - met   4  Pt will report overall improved tolerance for sustained sitting by at least 25-50% since [facility] with overall reduced pain/HA levels and reduced fear avoidance  - progressing, cont'd sx with computer use     LTGs to be achieved in 8-12 weeks:    1  Pt will be independent with HEP, demonstrating proper technique with exercises and understanding of self progression of program without need for cueing or assistance  - progressing   2  Pt will report minimized pain levels with at least 75% reduction in pain since [facility]  - met   3  Pt will demonstrate WFL AROM and strength of c-spine and SCT region without pain/sx  - progressing   4  Pt will demonstrate understanding of proper posture and impact of poor posture/sustained poor postures on patient pain/sx  - progressing   5  Pt will report return to work and studying without limitations  - progressing   6   FOTO will reflect score at discharge that is greater than or equal to predicted level  - progressing           Plan  Patient would benefit from: skilled physical therapy  Referral necessary: No  Planned modality interventions: cryotherapy and thermotherapy: hydrocollator packs  Planned therapy interventions: manual therapy, neuromuscular re-education, patient education, postural training, self care, strengthening, stretching, therapeutic exercise, home exercise program, motor coordination training and joint mobilization  Frequency: 2x week  Duration in visits: 8  Duration in weeks: 4  Plan of Care beginning date: 8/10/2020  Plan of Care expiration date: 2020  Treatment plan discussed with: patient        Subjective Evaluation    History of Present Illness  Mechanism of injury: Pt notes overall about 75-80% improvement in sx since [facility]  Reports reduced HA frequency and intensity  Notes she has been "feeling better" for the past 1-2 weeks, however is unsure if this is 2* PT/exercises or having been doing "less grad work" on her computer at home  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  She notes c/c of persistent intermittent L sided > R upper neck/c-spine pain; notes neck pain always starts prior to HA and her HA is always worse with worsening neck pain  Pain can go into L UT however is not radiating otherwise  No n/t or sensory changes  NO dizziness, imbalance, vision change, memory deficit, etc  Cont'd triggers remain to be extended sitting/reading or computer work  Sx are reduced with "getting up/moving around" and stretching her neck  No new sx/complaints     Quality of life: good    Pain  Current pain rating: 3  At best pain ratin  At worst pain ratin  Location: Neck pain posterior c-spine B L > R, UTs L> R   Quality: dull ache and tight  Relieving factors: heat and medications  Aggravating factors: sitting  Progression: improved    Social Support  Lives with: spouse    Employment status: working (Full time teacher, also in grad school )  Hand dominance: right      Diagnostic Tests  Abnormal MRI: upcoming  Treatments  Current treatment: physical therapy  Current treatment comments: Melatonin, ibuprophen prn   Patient Goals  Patient goals for therapy: decreased pain, independence with ADLs/IADLs and return to sport/leisure activities          Objective     Concurrent Complaints  Positive for headaches and history of trauma  Negative for night pain, disturbed sleep, dizziness, faints, nausea/motion sickness, tinnitus, trouble swallowing, difficulty breathing, shortness of breath, respiratory pain and visual change    Additional Special Questions  No new trauma   No new sx/complaints     Postural Observations  Seated posture: fair  Standing posture: fair  Correction of posture: makes symptoms better    Additional Postural Observation Details  Forward head/rounded shoulders- continues   Increased thoracic kyphosis - continues   B scapular depression and protraction with mild winging -continues   Overall improving postural awareness with exercises; continues to assume fair-poor resting posture         Tenderness   Cervical Spine   Tenderness in the spinous process       Additional Tenderness Details  Moderate ttp B suboccipital region, L >R; continues, mild-moderate at re-eval   Moderate ttp L SCM/scalenes and L UT> R proximally - continues mild-moderate   Continues with increased tension R/L UT mm     Moderate ttp centrally over spinous processes T1-C2/3; no radicular sx or change in HA with palpation or gentle mobilization- mild-moderate at re-eval   Reduced HA however noted after manual techniques   Will cont to assess     Neurological Testing     Sensation   Cervical/Thoracic   Left   Intact: light touch    Right   Intact: light touch    Active Range of Motion   Cervical/Thoracic Spine       Cervical    Flexion: 65 degrees   Extension: 58 (discomfort R/L suboccpitial region at end range ) degrees     with pain  Left lateral flexion: 46 degrees Right lateral flexion: 50 ("tight" L UT region proximally at end range) degrees      Left rotation: 85 degrees  Right rotation: 80 (Min discomfort L SCM and suboccpitial region at end range ) degrees    with pain    Additional Active Range of Motion Details  NO change in neck pain/HA with or following AROM assessment     Passive Range of Motion   Cervical/Thoracic Spine   Normal passive range of motion    Joint Play   Joints within functional limits: C3, C4, C5, C6 and C7     Comments: T-spine TBA    Strength/Myotome Testing     Left Shoulder     Planes of Motion   Flexion: 4+   Abduction: 4+   External rotation at 0°: 4   Internal rotation at 0°: 4+     Isolated Muscles   Lower trapezius: 4-   Middle trapezius: 4-   Upper trapezius: 4+     Right Shoulder     Planes of Motion   Flexion: 4+   Abduction: 4+   External rotation at 0°: 4   Internal rotation at 0°: 4+     Isolated Muscles   Lower trapezius: 4-   Middle trapezius: 4-   Upper trapezius: 4+     Left Elbow   Flexion: 4+  Extension: 4+    Right Elbow   Flexion: 4+  Extension: 4+    Additional Strength Details  B UE strength grossly WNL without pain/sx     Continues with weakness and reduced mm endurance B MT/LT mms however is improving with exercise program     Tests   Cervical   Positive neck flexor muscle endurance test   Negative vertical compression, lumbar distraction, Lhermitte's sign, alar ligament test, Sharp-Chantal test and craniocervical flexion test       Left   Positive Spurling's Test A and cervical flexion-rotation test       Right   Positive Spurling's Test A  Lumbar   Negative vertical compression       Additional Tests Details  Will cont to assess    + Cervical flexion rotation test today to L with reduced ROM > 10* vs to the R; this was resolved and WNL after MET to improve L cervical rotation in flexion; reduced HA after tx  Will cont to assess     +R/L Spurlings for local L > R suboccipital/upper c-spine region pain centrally; no radicular pain/sx     Continues with mild deficits with craniocervical flexion with reduced strength with DNF with stabilizer for feedback -improving; can reach gray level on stabilizer for 10 sec hold for ten reps   Mild endurance deficits with DNF endurance test= 20-25 seconds today with min compensation   Will cont to assess  TMJ   Joint sounds right: clicking  Opening (mm): right deviation   ROM comments: Clicking noted /palpated upon opening and closing jaw at R TMJ  Mild ttp R> L LTMJ  Lateral deviation to right noted with end range jaw opening  Mild ttp R and L temporal mms; no ttp R/L masseter mms   Will cont to assess   Neuro Exam:     Headaches   Patient reports headaches: Yes  Precautions HA, s/p concussion    Re-eval Date: 8/12  Ins: 8 visit, exp 12/10      Date 7/29 8 5 20 8/10     Visit Count 6/8 7/8 8/8     FOTO 7/29       Pain In 3/10 3/10 3/10     Pain Out 1/10 1/10 1/10            Manuals 7/29 8 5 20 8/10     SOR to tolerance, MET to suboccipitals, STM B posterior c-spine/UTs/suboccpitials, gentle c-spine manual traction, UT stretch B   GIASTM posterior cspine and suboccipital region B pt seated instrument #4 gentle sweeping/fanning, SOR, MET to suboccipitals to improve OA flexion, intermittent manual c-spine traction  To tolerance, Supine CPA mobs upper c-spine to tolerance grades 2-4 to tolerance  25' total  IASTM posterior cspine and suboccipital region B pt seated     gentle sweeping/fanning, SOR     manual c-spine traction  To tolerance,  25 min total   C-spine PROM to tolerance, Gentle SOR, gentle c-spine manual traction to tolerance, CPA mobs grades 2-3 upper c-spine to tolerance, MET to improve L cervical  rotation in flexion to tolerance, STM L /R suboccipital regions and L/R UTs to tolerance   20'                             Neuro Re-Ed         DNF activation supine with chin tucks HEP       DNF hold with stabilizer feedback HEP       Neutral/resting jaw position sitting/supine HEP                                       Ther Ex  8  5 20      UBE alt 10' 60 rpm  Alt  10' 60 rpm  Alt  10' 60 rpm  Alt      UT stretch    Levator stretch    Scalene stretch   Supine DNF endurance  4x 25" holds   Cues      Prone I, T, Ys      Prone rows 15x/10-15" ea 15x/10-15" ea 15x/10-15" ea     Scap push up plus Full Planks  2x10   Full Planks  2x10 Resume      Prone   Pivot at wall - endurance B ER with tband with ball on wall cervical stabilization   Blue   10x/10"  B ER with tband with ball on wall cervical stabilization   Blue   10x/10" B ER with tband with ball on wall cervical stabilization   Blue   10x/10"     JAYY neutral c-spine hold with  cervical retraction with tband    Blue band  10" 6x  Cues/feedback     Iron neck                 Ther Activity                        Gait Training                        Modalities deferred  deferred

## 2020-08-11 ENCOUNTER — APPOINTMENT (OUTPATIENT)
Dept: PHYSICAL THERAPY | Facility: CLINIC | Age: 25
End: 2020-08-11
Payer: COMMERCIAL

## 2020-08-13 ENCOUNTER — OFFICE VISIT (OUTPATIENT)
Dept: PHYSICAL THERAPY | Facility: CLINIC | Age: 25
End: 2020-08-13
Payer: COMMERCIAL

## 2020-08-13 DIAGNOSIS — S06.0X0D CONCUSSION, MENTAL CONFUSION OR DISORIENTATION NO LOSS CONSCIOUS, SUBSEQUENT ENCOUNTER: ICD-10-CM

## 2020-08-13 DIAGNOSIS — M54.2 NECK PAIN: Primary | ICD-10-CM

## 2020-08-13 PROCEDURE — 97112 NEUROMUSCULAR REEDUCATION: CPT

## 2020-08-13 PROCEDURE — 97110 THERAPEUTIC EXERCISES: CPT

## 2020-08-13 PROCEDURE — 97140 MANUAL THERAPY 1/> REGIONS: CPT

## 2020-08-13 NOTE — PROGRESS NOTES
Daily Note     Today's date: 2020  Patient name: Keara Og  : 1995  MRN: [id number]  Referring provider: Norma Fajardo MD  Dx:   Encounter Diagnosis     ICD-10-CM    1  Neck pain  M54 2    2  Concussion, mental confusion or disorientation no loss conscious, subsequent encounter  S06 0X0D        Start Time: 1400  Stop Time: 1500  Total time in clinic (min): 60 minutes    Subjective: "I have a lot of stiffness and pain on both sides for my neck  Over the last couple of days, I've had a constant HA  Nothing helps relieve it "      Objective: See treatment diary below      Assessment: Tolerated treatment fair  Cueing needed for form for B ER  Able to maintain 20 plus seconds for DNF  Minimal restrictions noted with PROM and SOR  No muscle spasms noted bilaterally at UTs  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 and virtual schooling  Pt did note returning from vacation beginning of August from off roading with 4 wheelers without significant symptoms  Patient demonstrated fatigue post treatment and would benefit from continued PT      Plan: Continue per plan of care  Progress treatment as tolerated  Precautions HA, s/p concussion    Re-eval Date:   Ins: 16 visit, exp 9/10      Date  8 5 20 8/10 8/13    Visit Count     FOTO        Pain In 3/10 3/10 3/10 4/10    Pain Out 1/10 1/10 1/10 2/10           Manuals  8 5 20 8/10 8/13    SOR to tolerance, MET to suboccipitals, STM B posterior c-spine/UTs/suboccpitials, gentle c-spine manual traction, UT stretch B   GIASTM posterior cspine and suboccipital region B pt seated instrument #4 gentle sweeping/fanning, SOR, MET to suboccipitals to improve OA flexion, intermittent manual c-spine traction  To tolerance, Supine CPA mobs upper c-spine to tolerance grades 2-4 to tolerance  25' total  IASTM posterior cspine and suboccipital region B pt seated     gentle sweeping/fanning, SOR     manual c-spine traction  To tolerance,  25 min total   C-spine PROM to tolerance, Gentle SOR, gentle c-spine manual traction to tolerance, CPA mobs grades 2-3 upper c-spine to tolerance, MET to improve L cervical  rotation in flexion to tolerance, STM L /R suboccipital regions and L/R UTs to tolerance   20' IASTM posterior cspine and suboccipital region B pt seated     gentle sweeping/fanning, SOR     manual c-spine traction  To tolerance,  25 min total                            Neuro Re-Ed         DNF activation supine with chin tucks HEP       DNF hold with stabilizer feedback HEP       Neutral/resting jaw position sitting/supine HEP                                       Ther Ex  8  5 20      UBE alt 10' 60 rpm  Alt  10' 60 rpm  Alt  10' 60 rpm  Alt  10' 60 rpm  Alt     UT stretch    Levator stretch    Scalene stretch   Supine DNF endurance  4x 25" holds   Cues  Supine DNF endurance  4x 25" holds   Cues     Prone I, T, Ys      Prone rows 15x/10-15" ea 15x/10-15" ea 15x/10-15" ea 15x/10-15" ea    Scap push up plus Full Planks  2x10   Full Planks  2x10 Resume      Prone   Pivot at wall - endurance B ER with tband with ball on wall cervical stabilization   Blue   10x/10"  B ER with tband with ball on wall cervical stabilization   Blue   10x/10" B ER with tband with ball on wall cervical stabilization   Blue   10x/10" B ER with tband with ball on wall cervical stabilization   Blue   2x10/10"    JAYY neutral c-spine hold with  cervical retraction with tband    Blue band  10" 6x  Cues/feedback Blue band  10" 6x  Cues/feedback    Iron neck                 Ther Activity                        Gait Training                        Modalities deferred  deferred

## 2020-08-17 ENCOUNTER — OFFICE VISIT (OUTPATIENT)
Dept: PHYSICAL THERAPY | Facility: CLINIC | Age: 25
End: 2020-08-17
Payer: COMMERCIAL

## 2020-08-17 DIAGNOSIS — S06.0X0D CONCUSSION, MENTAL CONFUSION OR DISORIENTATION NO LOSS CONSCIOUS, SUBSEQUENT ENCOUNTER: ICD-10-CM

## 2020-08-17 DIAGNOSIS — M54.2 NECK PAIN: Primary | ICD-10-CM

## 2020-08-17 PROCEDURE — 97140 MANUAL THERAPY 1/> REGIONS: CPT | Performed by: PHYSICAL MEDICINE & REHABILITATION

## 2020-08-17 PROCEDURE — 97110 THERAPEUTIC EXERCISES: CPT | Performed by: PHYSICAL MEDICINE & REHABILITATION

## 2020-08-17 NOTE — PROGRESS NOTES
Daily Note     Today's date: 2020  Patient name: Keara Og  : 1995  MRN: [id number]  Referring provider: Norma Fajardo MD  Dx:   Encounter Diagnosis     ICD-10-CM    1  Neck pain  M54 2    2  Concussion, mental confusion or disorientation no loss conscious, subsequent encounter  S06 0X0D                   Subjective: Pt notes she continues to feel more pain in the muscles at the base of her head and upper neck  No known trigger  Notes she feels her HA continues to originate from the L side of her upper neck and it sometimes worse after attempting to stretch or moving her neck "the wrong way"  No new sx /complaints  Objective: See treatment diary below      Assessment: Tolerated treatment well  Spent greater time this date focusing on manual tx 2* pt's cont'd sx/complaints that appear to be largely muscular at this time  She continues to note exacerbation of HA with muscular pain in her c-spine and reduction in HA with reduction in neck pain  She continues to have c/o tension in suboccipital region B with restricted OA mobility noted; improved after manual tx  Reports tenderness in mid portion of B SCM, L > R  Added self SCM and scalene and well as manual B SCM stretch to POC today; pt tolerated well  PROM c-spine remains WNL despite muscular sx/complaints   Tender points noted in R/L SCM with GIASTM tx this date  Continues to demonstrate forward head posture; re-educated pt on chin tucks in supine, sitting, and standing; understanding noted and demonstrated  Overall reduced HA end of tx  Reviewed HEP with no questions noted  Patient demonstrated fatigue post treatment and would benefit from continued PT      Plan: Continue per plan of care  Progress treatment as tolerated         Precautions HA, s/p concussion    Re-eval Date:   Ins: 16 visit, exp 9/10      Date  8 5 20 8/10 8/13 8/17   Visit Count  7/8 8/8 9/16 10/16   FOTO        Pain In 3/10 3/10 3/10 4/10 4/10   Pain Out 1/10 1/10 1/10 2/10 2/10          Manuals 7/29 8 5 20 8/10 8/13 8/17   SOR to tolerance, MET to suboccipitals, STM B posterior c-spine/UTs/suboccpitials, gentle c-spine manual traction, UT stretch B   GIASTM posterior cspine and suboccipital region B pt seated instrument #4 gentle sweeping/fanning, SOR, MET to suboccipitals to improve OA flexion, intermittent manual c-spine traction  To tolerance, Supine CPA mobs upper c-spine to tolerance grades 2-4 to tolerance  25' total  IASTM posterior cspine and suboccipital region B pt seated     gentle sweeping/fanning, SOR     manual c-spine traction  To tolerance,  25 min total   C-spine PROM to tolerance, Gentle SOR, gentle c-spine manual traction to tolerance, CPA mobs grades 2-3 upper c-spine to tolerance, MET to improve L cervical  rotation in flexion to tolerance, STM L /R suboccipital regions and L/R UTs to tolerance   20' IASTM posterior cspine and suboccipital region B pt seated     gentle sweeping/fanning, SOR     manual c-spine traction  To tolerance,  25 min total   GIASTM posterior cspine and suboccipital region B and B SCMs as miladis pt seated instrument #4 gentle sweeping/fanning;  SOR, MET to suboccipitals to improve OA flexion, intermittent manual c-spine traction  To tolerance, Supine CPA mobs upper c-spine to tolerance grades 2-3 to tolerance, B SCM stretch  30' total                               Neuro Re-Ed         DNF activation supine with chin tucks HEP       DNF hold with stabilizer feedback HEP       Neutral/resting jaw position sitting/supine HEP                                       Ther Ex  8  5 20      UBE alt 10' 60 rpm  Alt  10' 60 rpm  Alt  10' 60 rpm  Alt  10' 60 rpm  Alt  Nustep L4 10'    UT stretch    Levator stretch    Scalene stretch   Supine DNF endurance  4x 25" holds   Cues  Supine DNF endurance  4x 25" holds   Cues  UT stretch  4x30"ea  Levator   stretch  4x30"ea    SCM stretch  4x30"ea    Prone I, T, Ys      Prone rows 15x/10-15" ea 15x/10-15" ea 15x/10-15" ea 15x/10-15" ea Reviewed HEP   Scap push up plus Full Planks  2x10   Full Planks  2x10 Resume      Prone   Pivot at wall - endurance B ER with tband with ball on wall cervical stabilization   Blue   10x/10"  B ER with tband with ball on wall cervical stabilization   Blue   10x/10" B ER with tband with ball on wall cervical stabilization   Blue   10x/10" B ER with tband with ball on wall cervical stabilization   Blue   2x10/10"    JAYY neutral c-spine hold with  cervical retraction with tband    Blue band  10" 6x  Cues/feedback Blue band  10" 6x  Cues/feedback    Iron neck              Chin tucks- reviewed    Ther Activity                        Gait Training                        Modalities deferred  deferred  Deferred to home

## 2020-08-20 ENCOUNTER — OFFICE VISIT (OUTPATIENT)
Dept: PHYSICAL THERAPY | Facility: CLINIC | Age: 25
End: 2020-08-20
Payer: COMMERCIAL

## 2020-08-20 DIAGNOSIS — M54.2 NECK PAIN: Primary | ICD-10-CM

## 2020-08-20 PROCEDURE — 97110 THERAPEUTIC EXERCISES: CPT | Performed by: PHYSICAL MEDICINE & REHABILITATION

## 2020-08-20 PROCEDURE — 97140 MANUAL THERAPY 1/> REGIONS: CPT | Performed by: PHYSICAL MEDICINE & REHABILITATION

## 2020-08-20 NOTE — PROGRESS NOTES
Daily Note     Today's date: 2020  Patient name: Keara Og  : 1995  MRN: [id number]  Referring provider: Norma Fajardo MD  Dx:   Encounter Diagnosis     ICD-10-CM    1  Neck pain  M54 2                   Subjective: pt notes overall no new sx/complaints  Continues to report her Has/neck pain remain overall about the same over the past 2-3 weeks  She reports she does not feel any of her exercises increase her sx  Does report some relief with manual therapy and GIASTM  Objective: See treatment diary below      Assessment: Tolerated treatment well  Noted no change in HA/neck pain with MT  Good PROM noted all planes c-spine without pain/sx with manual tx  Continues to c/o general mm tightness suboccipital region into proximal UTs c-spine PVMS B  Demonstrates poor cervical extensor endurance and improved DNF endurance  Reports reduced neck pain and HA after there ex  May benefit from addition of Iron Neck to POC to address cont'd neck strength and stabilization deficits to reduce HA and neck pain  Patient demonstrated fatigue post treatment and would benefit from continued PT      Plan: Continue per plan of care  Progress treatment as tolerated  Add IRON neck next week as miladis        Precautions HA, s/p concussion    Re-eval Date:   Ins: 16 visit, exp 9/10      Date        Visit Count        FOTO        Pain In 4/10       Pain Out 2/10              Manuals        SOR to tolerance, MET to suboccipitals, STM B posterior c-spine/UTs/suboccpitials, gentle c-spine manual traction, UT stretch B GIASTM posterior cspine and suboccipital region B and B SCMs as miladis pt seated instrument #4 gentle sweeping/fanning;  SOR, intermittent manual c-spine traction  To tolerance, Supine CPA mobs upper c-spine to tolerance grades 2-3 to tolerance, B SCM and UT stretch  30' total                               Neuro Re-Ed         DNF activation supine with chin tucks HEP       DNF hold with stabilizer feedback HEP       Neutral/resting jaw position sitting/supine HEP                                       Ther Ex        UBE alt 10' 60 rpm  Alt        UT stretch    Levator stretch    Scalene stretch Supine DNF endurance  4x 25" holds   Cues        Prone I, T, Ys      Prone rows 15x/10-15" ea       Scap push up plus Full Planks  2x10         Prone   Pivot at wall - endurance B ER with tband with ball on wall cervical stabilization   Blue   30x/10"        JAYY neutral c-spine hold with  cervical retraction with tband  Resume  Blue band        Iron neck  NV               Ther Activity                        Gait Training                        Modalities deferred  deferred  Deferred to home

## 2020-08-24 ENCOUNTER — OFFICE VISIT (OUTPATIENT)
Dept: PHYSICAL THERAPY | Facility: CLINIC | Age: 25
End: 2020-08-24
Payer: COMMERCIAL

## 2020-08-24 DIAGNOSIS — M54.2 NECK PAIN: Primary | ICD-10-CM

## 2020-08-24 DIAGNOSIS — S06.0X0D CONCUSSION, MENTAL CONFUSION OR DISORIENTATION NO LOSS CONSCIOUS, SUBSEQUENT ENCOUNTER: ICD-10-CM

## 2020-08-24 PROCEDURE — 97110 THERAPEUTIC EXERCISES: CPT

## 2020-08-24 PROCEDURE — 97140 MANUAL THERAPY 1/> REGIONS: CPT

## 2020-08-24 NOTE — PROGRESS NOTES
Daily Note     Today's date: 2020  Patient name: Na Vega  : 1995  MRN: [id number]  Referring provider: Salima Vásquez MD  Dx:   Encounter Diagnosis     ICD-10-CM    1  Neck pain  M54 2    2  Concussion, mental confusion or disorientation no loss conscious, subsequent encounter  S06 0X0D                   Subjective: No complaints at this time  Rates pain level at 3/10 and states her pain has been consistent at this level over the past couple of weeks  Objective: See treatment diary below      Assessment: Tolerated treatment well  Patient exhibited good technique with therapeutic exercises and would benefit from continued PT  Pt able to demonstrate independence with there ex and should be able to advance to iron helmet ex  Plan: Progress treatment as tolerated         Precautions HA, s/p concussion    Re-eval Date:   Ins: 16 visit, exp 9/10      Date       Visit Count       FOTO        Pain In 4/10 3/10      Pain Out 2/10 3/10             Manuals       SOR to tolerance, MET to suboccipitals, STM B posterior c-spine/UTs/suboccpitials, gentle c-spine manual traction, UT stretch B GIASTM posterior cspine and suboccipital region B and B SCMs as miladis pt seated instrument #4 gentle sweeping/fanning;  SOR, intermittent manual c-spine traction  To tolerance, Supine CPA mobs upper c-spine to tolerance grades 2-3 to tolerance, B SCM and UT stretch  30' total GIASTM posterior cspine and suboccipital region B and B SCMs as miladis pt seated instrument #4 gentle sweeping/fanning;  SOR, intermittent manual c-spine traction  To tolerance,  B SCM and UT stretch  35' total                              Neuro Re-Ed         DNF activation supine with chin tucks HEP       DNF hold with stabilizer feedback HEP       Neutral/resting jaw position sitting/supine HEP                                       Ther Ex        UBE alt 10' 60 rpm  Alt  Resume  UA      UT stretch    Levator stretch    Scalene stretch Supine DNF endurance  4x 25" holds   Cues  Supine DNF endurance  4x 25" holds   Cues      Prone I, T, Ys      Prone rows 15x/10-15" ea 15x/10-15" ea      Scap push up plus Full Planks  2x10   Full Planks  2x10        Prone   Pivot at wall - endurance B ER with tband with ball on wall cervical stabilization   Blue   30x/10"  B ER with tband with ball on wall cervical stabilization   Blue   30x/10"       JAYY neutral c-spine hold with  cervical retraction with tband  Resume  Blue band        Iron neck   NV              Ther Activity                        Gait Training                        Modalities deferred  deferred  Deferred to home

## 2020-08-26 ENCOUNTER — OFFICE VISIT (OUTPATIENT)
Dept: PHYSICAL THERAPY | Facility: CLINIC | Age: 25
End: 2020-08-26
Payer: COMMERCIAL

## 2020-08-26 DIAGNOSIS — S06.0X0D CONCUSSION, MENTAL CONFUSION OR DISORIENTATION NO LOSS CONSCIOUS, SUBSEQUENT ENCOUNTER: ICD-10-CM

## 2020-08-26 DIAGNOSIS — M54.2 NECK PAIN: Primary | ICD-10-CM

## 2020-08-26 PROCEDURE — 97140 MANUAL THERAPY 1/> REGIONS: CPT | Performed by: PHYSICAL MEDICINE & REHABILITATION

## 2020-08-26 PROCEDURE — 97110 THERAPEUTIC EXERCISES: CPT | Performed by: PHYSICAL MEDICINE & REHABILITATION

## 2020-08-26 NOTE — PROGRESS NOTES
Daily Note     Today's date: 2020  Patient name: Thao Yin  : 1995  MRN: [id number]  Referring provider: Dino Waddell MD  Dx:   Encounter Diagnosis     ICD-10-CM    1  Neck pain  M54 2    2  Concussion, mental confusion or disorientation no loss conscious, subsequent encounter  S06 0X0D                   Subjective: Pt notes increased neck pain B today L > R  Mild HA 1/10  Notes she has returned to school  Increased neck pain "sitting on the computer all day"  No new sx/complaints  Does feel overall her Has are improving and "not as bad"  Objective: See treatment diary below      Assessment: Tolerated treatment well  Demonstrates full PROM c-spine without pain/sx  Continues with increased B suboccipital tension  Continues to report reduced HA/neck pain after manual txs  No increased pain with exercises  Improving DNF and SCT mm endurance  Overall reduced sx end of tx  Continues with signs/sx of cervicogenic HA with additional stress/tension HA  No complaints after tx  Patient demonstrated fatigue post treatment and would benefit from continued PT      Plan: Continue per plan of care  Progress treatment as tolerated         Precautions HA, s/p concussion    Re-eval Date:   Ins: 16 visit, exp 9/10      Date      Visit Count      FOTO        Pain In 4/10 3/10 4/10     Pain Out 2/10 3/10 2/10             Manuals      SOR to tolerance, MET to suboccipitals, STM B posterior c-spine/UTs/suboccpitials, gentle c-spine manual traction, UT stretch B GIASTM posterior cspine and suboccipital region B and B SCMs as miladis pt seated instrument #4 gentle sweeping/fanning;  SOR, intermittent manual c-spine traction  To tolerance, Supine CPA mobs upper c-spine to tolerance grades 2-3 to tolerance, B SCM and UT stretch  30' total GIASTM posterior cspine and suboccipital region B and B SCMs as miladis pt seated instrument #4 gentle sweeping/fanning;  SOR, intermittent manual c-spine traction  To tolerance,  B SCM and UT stretch  35' total GIASTM posterior cspine and suboccipital region B and B SCMs as miladis pt seated instrument #4 gentle sweeping/fanning;  SOR, intermittent manual c-spine traction  To tolerance,  B SCM and UT stretch  30'' total                             Neuro Re-Ed         DNF activation supine with chin tucks HEP       DNF hold with stabilizer feedback HEP       Neutral/resting jaw position sitting/supine HEP                                       Ther Ex        UBE alt 10' 60 rpm  Alt  Resume  UA 10' 60 rpm  Alt      UT stretch    Levator stretch    Scalene stretch Supine DNF endurance  4x 25" holds   Cues  Supine DNF endurance  4x 25" holds   Cues Supine DNF endurance  4x 25" holds   Cues     Prone I, T, Ys      Prone rows 15x/10-15" ea 15x/10-15" ea 15x/10-15" ea     Scap push up plus Full Planks  2x10   Full Planks  2x10   Full Planks  2x10       Prone   Pivot at wall - endurance B ER with tband with ball on wall cervical stabilization   Blue   30x/10"  B ER with tband with ball on wall cervical stabilization   Blue   30x/10"  HEP     JAYY neutral c-spine hold with  cervical retraction with tband  Resume  Blue band   10" 7x      Iron neck   NV NP 2* neck soreness              Ther Activity                        Gait Training                        Modalities deferred  deferred

## 2020-08-31 ENCOUNTER — OFFICE VISIT (OUTPATIENT)
Dept: PHYSICAL THERAPY | Facility: CLINIC | Age: 25
End: 2020-08-31
Payer: COMMERCIAL

## 2020-08-31 DIAGNOSIS — M54.2 NECK PAIN: Primary | ICD-10-CM

## 2020-08-31 DIAGNOSIS — S06.0X0D CONCUSSION, MENTAL CONFUSION OR DISORIENTATION NO LOSS CONSCIOUS, SUBSEQUENT ENCOUNTER: ICD-10-CM

## 2020-08-31 PROCEDURE — 97140 MANUAL THERAPY 1/> REGIONS: CPT | Performed by: PHYSICAL MEDICINE & REHABILITATION

## 2020-08-31 PROCEDURE — 97110 THERAPEUTIC EXERCISES: CPT | Performed by: PHYSICAL MEDICINE & REHABILITATION

## 2020-08-31 NOTE — PROGRESS NOTES
Daily Note     Today's date: 2020  Patient name: Thao Yin  :   MRN: [id number]  Referring provider: Dino Waddell MD  Dx:   Encounter Diagnosis     ICD-10-CM    1  Neck pain  M54 2    2  Concussion, mental confusion or disorientation no loss conscious, subsequent encounter  S06 0X0D                   Subjective: Pt notes overall since "sitting hunched over a computer last week" she has had cont'd increased B posterior neck pain  Notes with increased neck pain, she has increased HA  No new sx/complaints  Has no f/u with MD scheduled  Objective: See treatment diary below      Assessment: Tolerated treatment well  Continues to report reduced neck pain/HA after manual tx  Continues with full PROM c-spine without pain/sx  General tenderness persists over c-spine with CPA mobilizations; WNL mobility noted  Weakness of lower c-spine extensors noted with prone c-spine extensions over EOT; fatigued quickly  Reviewed HEP with on questions/concerns  Pt was encouraged to f/u with MD 2* cont'd sx without overall change; may benefit from referral to neurology  Pt in agreement  RA NVPatient demonstrated fatigue post treatment and would benefit from continued PT      Plan: Continue per plan of care  Progress treatment as tolerated         Precautions HA, s/p concussion    Re-eval Date:   Ins: 16 visit, exp 9/10      Date     Visit Count     FOTO        Pain In 4/10 3/10 4/10 5/10    Pain Out 2/10 3/10 2/10  3/10           Manuals     SOR to tolerance, MET to suboccipitals, STM B posterior c-spine/UTs/suboccpitials, gentle c-spine manual traction, UT stretch B GIASTM posterior cspine and suboccipital region B and B SCMs as miladis pt seated instrument #4 gentle sweeping/fanning;  SOR, intermittent manual c-spine traction  To tolerance, Supine CPA mobs upper c-spine to tolerance grades 2-3 to tolerance, B SCM and UT stretch  30' total GIASTM posterior cspine and suboccipital region B and B SCMs as miladis pt seated instrument #4 gentle sweeping/fanning;  SOR, intermittent manual c-spine traction  To tolerance,  B SCM and UT stretch  35' total GIASTM posterior cspine and suboccipital region B and B SCMs as miladis pt seated instrument #4 gentle sweeping/fanning;  SOR, intermittent manual c-spine traction  To tolerance,  B SCM and UT stretch  30'' total GIASTM posterior cspine and suboccipital region B and B SCMs as mildais pt seated instrument #4 gentle sweeping/fanning;  SOR, intermittent manual c-spine traction  To tolerance,  B SCM and UT stretch  30' total                            Neuro Re-Ed         DNF activation supine with chin tucks HEP       DNF hold with stabilizer feedback HEP       Neutral/resting jaw position sitting/supine HEP                                       Ther Ex        UBE alt 10' 60 rpm  Alt  Resume  UA 10' 60 rpm  Alt  10' 60 rpm  Alt     UT stretch    Levator stretch    Scalene stretch Supine DNF endurance  4x 25" holds   Cues  Supine DNF endurance  4x 25" holds   Cues Supine DNF endurance  4x 25" holds   Cues Reviewed HEP    Prone I, T, Ys      Prone rows 15x/10-15" ea 15x/10-15" ea 15x/10-15" ea Reviewed HEP      NV      Prone neck extension with cues over EOT  10x/10"    Scap push up plus Full Planks  2x10   Full Planks  2x10   Full Planks  2x10   Reviewed HEP    Prone   Pivot at wall - endurance B ER with tband with ball on wall cervical stabilization   Blue   30x/10"  B ER with tband with ball on wall cervical stabilization   Blue   30x/10"  HEP B ER with tband with ball on wall cervical stabilization   Blue   30x/10"     JAYY neutral c-spine hold with  cervical retraction with tband  Resume  Blue band   10" 7x  Reviewed HEP    Iron neck   NV NP 2* neck soreness  Attempted-unable to properly fit helmet on pt             Ther Activity                        Gait Training                        Modalities deferred  deferred deferred

## 2020-09-02 ENCOUNTER — EVALUATION (OUTPATIENT)
Dept: PHYSICAL THERAPY | Facility: CLINIC | Age: 25
End: 2020-09-02
Payer: COMMERCIAL

## 2020-09-02 DIAGNOSIS — S06.0X0D CONCUSSION, MENTAL CONFUSION OR DISORIENTATION NO LOSS CONSCIOUS, SUBSEQUENT ENCOUNTER: ICD-10-CM

## 2020-09-02 DIAGNOSIS — M54.2 NECK PAIN: Primary | ICD-10-CM

## 2020-09-02 PROCEDURE — 97140 MANUAL THERAPY 1/> REGIONS: CPT | Performed by: PHYSICAL MEDICINE & REHABILITATION

## 2020-09-02 PROCEDURE — 97110 THERAPEUTIC EXERCISES: CPT | Performed by: PHYSICAL MEDICINE & REHABILITATION

## 2020-09-02 NOTE — PROGRESS NOTES
PT Re-Evaluation     Today's date: 2020  Patient name: Murray Carrel  : 1995  MRN: [id number]  Referring provider: Janice Rodríguez MD  Dx:   Encounter Diagnosis     ICD-10-CM    1  Neck pain  M54 2    2  Concussion, mental confusion or disorientation no loss conscious, subsequent encounter  S06 0X0D                   Assessment  Assessment details: Murray Carrel is a 30 y o  Female returning to outpatient physical therapy with diagnosis of Concussion without loss of consciousness, subsequent encounter and Neck pain  She has been compliant with OPPT to date, attending 15 total PT sessions  She continues to report slow but steady improvements in neck pain and HA with overall reduction in intensity and frequency of HA reported  Notes recent return of elevated neck pain/ HA 2* 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  Sx appear to be largely cervicogenic with pt noting onset of neck pain "always" prior to onset of HA  Also notes reduction in HA with reduction in neck pain  Sx cont to be triggered and exacerbated by posture with spending long hours seated/at computer  She reports persistent mm tightness in the suboccipital region and notes reduction in sx with manual tx and GIASTM  Improvements noted in DNF mm activation and endurance  Continues with weakness and reduced mm endurance in the neck scapulothoracic extensors with cont'd increased forward head posture  Improved AROM noted c-spine all planes and PROM is noted to be full and pain free with no sx with overpressure; despite this, she cont to c/o "tightness"/discomfort in suboccipital region with lateral flexion R/L Encouraged pt to make f/u with referring MD 2* cont'd sx   Patient to benefit from cont'd  skilled outpatient physical therapy 1-2x/week for 4-6 weeks in order to reduce pain, maximize pain free range of motion, increase strength and stability, improve postural awareness, and improve functional mobility/functional activity in order to maximize return to prior level of function with reduced limitations  Will continue to focus on postural deficits and well as strength/endurance deficits of the neck and scapulothoracic extensors/stabilizers  May benefit from referral to neurology 2* cont'd HA  Thank you for your referral     Impairments: abnormal muscle tone, abnormal or restricted ROM, activity intolerance, impaired physical strength, pain with function, poor posture  and poor body mechanics  Other impairment: HA     Symptom irritability: moderateUnderstanding of Dx/Px/POC: good   Prognosis: good    Goals  STGs to be achieved in 4-6 weeks:    1  Pt will report reduced neck pain and HA pain levels "at worst" by at least 2 points (0-10 scale) in order to allow improved tolerance for functional mobility and reduced reliance on medication or modalities for pain relief  - met   2  Pt will demonstrate improved deep neck flexor (DNF) strength and muscle endurance as noted by at least 10 second improvement in DNF test of endurance from [facility] with proper form/tehnique without need for cues  - met  3  Pt to demonstrate reduced muscle tension and tenderness to palpation of B suboccipital region and upper c-spine by at least 50% from [facility] in order to restore soft tissue extensibility, reduced pan, and normalized ROM  - met   4  Pt will report overall improved tolerance for sustained sitting by at least 25-50% since [facility] with overall reduced pain/HA levels and reduced fear avoidance  - progressing, cont'd sx with computer use   5  Pt will demonstrate improved lower cervical extensor strength and muscle endurance as noted by ability to maintain neutral neck position in prone with head over EOT for at least 20 seconds with good form without need for cues  LTGs to be achieved in 8-12 weeks:    1   Pt will be independent with HEP, demonstrating proper technique with exercises and understanding of self progression of program without need for cueing or assistance  - progressing   2  Pt will report minimized pain levels with at least 75% reduction in pain since [facility]  - not met 2* return to school    3  Pt will demonstrate WFL AROM and strength of c-spine and SCT region without pain/sx  - progressing   4  Pt will demonstrate understanding of proper posture and impact of poor posture/sustained poor postures on patient pain/sx  - progressing   5  Pt will report return to work and studying without limitations  - progressing   6  FOTO will reflect score at discharge that is greater than or equal to predicted level  - progressing           Plan  Patient would benefit from: skilled physical therapy  Referral necessary: No  Planned modality interventions: cryotherapy and thermotherapy: hydrocollator packs  Planned therapy interventions: manual therapy, neuromuscular re-education, patient education, postural training, self care, strengthening, stretching, therapeutic exercise, home exercise program, motor coordination training and joint mobilization  Frequency: 2x week  Duration in visits: 8  Duration in weeks: 4  Plan of Care beginning date: 9/2/2020  Plan of Care expiration date: 10/2/2020  Treatment plan discussed with: patient        Subjective Evaluation    History of Present Illness  Mechanism of injury: Pt reports overall slight improvement from last Re-eval  Notes her HA/neck pain are no longer constant/every day  Notes slight reduction in intensity and frequency of overall HA  Notes she has noticed her neck pain always starts prior to onset of her HA  Notes neck pain/HA cont to be triggered and exacerbated by working on the computer and 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  No neck pain/HA with walking around/instructing students, however increased neck pain/HA today after spending all day on the computer  Overall notes no new sx/complaints  Paib remains in upper c-spine on R and L and into proximal R/L UT  No radicular sx, no n/t, no dizziness, no vision change or eye strain, no imbalance, etc  Has not yet RTD for f/u  Quality of life: good    Pain  Current pain ratin  At best pain ratin  At worst pain ratin  Location: Neck pain posterior c-spine B L > R, UTs L> R   Quality: dull ache and tight  Relieving factors: heat and medications  Aggravating factors: sitting  Progression: improved    Social Support  Lives with: spouse    Employment status: working (Full time teacher, also in grad school )  Hand dominance: right      Diagnostic Tests  Abnormal MRI: upcoming  Treatments  Current treatment: physical therapy  Current treatment comments: Melatonin, ibuprophen prn   Patient Goals  Patient goals for therapy: decreased pain, independence with ADLs/IADLs and return to sport/leisure activities          Objective     Concurrent Complaints  Positive for headaches and history of trauma  Negative for night pain, disturbed sleep, dizziness, faints, nausea/motion sickness, tinnitus, trouble swallowing, difficulty breathing, shortness of breath, respiratory pain and visual change    Additional Special Questions  No new trauma   No new sx/complaints     Postural Observations  Seated posture: fair  Standing posture: fair  Correction of posture: makes symptoms better    Additional Postural Observation Details  Forward head/rounded shoulders- continues   Increased thoracic kyphosis - continues   B scapular depression and protraction with mild winging -continues   Overall improving postural awareness with exercises; continues to assume fair-poor resting posture         Tenderness   Cervical Spine   No tenderness in the spinous process       Additional Tenderness Details  Moderate ttp B suboccipital region, L >R; continues, mild at re-eval   Moderate ttp L SCM/scalenes and L UT> R proximally - continues mild  Continues with increased tension R/L UT mm - improved with PROM/manual tx     Moderate ttp centrally over spinous processes T1-C2/3; no radicular sx or change in HA with palpation or gentle mobilization-not noted at re-eval   Reduced HA however noted after manual techniques - continues   Will cont to assess     Neurological Testing     Sensation   Cervical/Thoracic   Left   Intact: light touch    Right   Intact: light touch    Active Range of Motion   Cervical/Thoracic Spine       Cervical    Flexion:  WFL  Extension: 58 degrees     WFL  Left lateral flexion: Neck active lateral bend left: "tight/discomfort" suboccipital region at end range      [facility]  Right lateral flexion: Neck active lateral bend right: "tight/discomfort" suboccipital region at end range       WFL  Left rotation:  WFL  Right rotation: Neck active rotation right:      [facility]    Additional Active Range of Motion Details  NO change in neck pain/HA with or following AROM assessment     Passive Range of Motion   Cervical/Thoracic Spine   Normal passive range of motion    Joint Play   Joints within functional limits: C3, C4, C5, C6 and C7     Comments: T-spine TBA    Normal PROM all planes c-spine- no pain/sx with overpressure at all end ranges of PROM     Strength/Myotome Testing     Left Shoulder     Planes of Motion   Flexion: 5   Abduction: 4+   External rotation at 0°: 4+   Internal rotation at 0°: 4+     Isolated Muscles   Lower trapezius: 4   Middle trapezius: 4   Upper trapezius: 5     Right Shoulder     Planes of Motion   Flexion: 5   Abduction: 4+   External rotation at 0°: 4+   Internal rotation at 0°: 4+     Isolated Muscles   Lower trapezius: 4   Middle trapezius: 4   Upper trapezius: 5     Left Elbow   Flexion: 4+  Extension: 4+    Right Elbow   Flexion: 4+  Extension: 4+    Additional Strength Details  B UE strength grossly WNL without pain/sx     Continues with weakness and reduced mm endurance B MT/LT mms however is improving with exercise program - improving     Tests   Cervical   Negative vertical compression, lumbar distraction, Lhermitte's sign, alar ligament test, Sharp-Chantal test, craniocervical flexion test  and neck flexor muscle endurance test      Left   Positive Spurling's Test A  Negative cervical flexion-rotation test      Right   Positive Spurling's Test A  Lumbar   Negative vertical compression  Additional Tests Details      +R/L Spurlings for local L > R suboccipital/upper c-spine region pain centrally; no radicular pain/sx     Continues with mild deficits with craniocervical flexion with reduced strength with DNF with stabilizer for feedback -improving; can reach gray level on stabilizer for 10 sec hold for ten reps   Mild endurance deficits with DNF endurance test= 20-25 seconds today with min compensation - resolved/WNL, can hold >35" with good form without pain/sx     TMJ   Joint sounds right: clicking  Opening (mm): right deviation   ROM comments: Clicking noted /palpated upon opening and closing jaw at R TMJ  Mild ttp R> L LTMJ  Lateral deviation to right noted with end range jaw opening  Mild ttp R and L temporal mms; no ttp R/L masseter mms   Will cont to assess   Neuro Exam:     Headaches   Patient reports headaches: Yes                      Precautions HA, s/p concussion    Re-eval Date: 8/12  Ins: 16 visit, exp 9/10      Date 8/20 8/24 8/26 8/31 9/2   Visit Count 11/16 12/16 13/16 14/16 15/16   FOTO        Pain In 4/10 3/10 4/10 5/10 5/10   Pain Out 2/10 3/10 2/10  3/10 2-3/10           Manuals 8/20 8/24 8/26 8/31 9/2   SOR to tolerance, MET to suboccipitals, STM B posterior c-spine/UTs/suboccpitials, gentle c-spine manual traction, UT stretch B GIASTM posterior cspine and suboccipital region B and B SCMs as miladis pt seated instrument #4 gentle sweeping/fanning;  SOR, intermittent manual c-spine traction  To tolerance, Supine CPA mobs upper c-spine to tolerance grades 2-3 to tolerance, B SCM and UT stretch  30' total GIASTM posterior cspine and suboccipital region B and B SCMs as miladis pt seated instrument #4 gentle sweeping/fanning;  SOR, intermittent manual c-spine traction  To tolerance,  B SCM and UT stretch  35' total GIASTM posterior cspine and suboccipital region B and B SCMs as miladis pt seated instrument #4 gentle sweeping/fanning;  SOR, intermittent manual c-spine traction  To tolerance,  B SCM and UT stretch  30'' total GIASTM posterior cspine and suboccipital region B and B SCMs as miladis pt seated instrument #4 gentle sweeping/fanning;  SOR, intermittent manual c-spine traction  To tolerance,  B SCM and UT stretch  30' total GIASTM posterior cspine and suboccipital region B and B SCMs as miladis pt seated instrument #4 gentle sweeping/fanning;  SOR, intermittent manual c-spine traction  To tolerance,  B SCM and UT stretch  30' total                           Neuro Re-Ed         DNF activation supine with chin tucks HEP       DNF hold with stabilizer feedback HEP       Neutral/resting jaw position sitting/supine HEP                                       Ther Ex        UBE alt 10' 60 rpm  Alt  Resume  UA 10' 60 rpm  Alt  10' 60 rpm  Alt  10' 60 rpm  Alt    UT stretch    Levator stretch    Scalene stretch Supine DNF endurance  4x 25" holds   Cues  Supine DNF endurance  4x 25" holds   Cues Supine DNF endurance  4x 25" holds   Cues Reviewed HEP    Prone I, T, Ys      Prone rows 15x/10-15" ea 15x/10-15" ea 15x/10-15" ea Reviewed HEP      NV      Prone neck extension with cues over EOT  10x/10"             Prone neck extension with cues over EOT  10x/10-15"   Scap push up plus Full Planks  2x10   Full Planks  2x10   Full Planks  2x10   Reviewed HEP    Prone   Pivot at wall - endurance B ER with tband with ball on wall cervical stabilization   Blue   30x/10"  B ER with tband with ball on wall cervical stabilization   Blue   30x/10"  HEP B ER with tband with ball on wall cervical stabilization   Blue   30x/10"  B ER with tband with ball on wall cervical stabilization   Blue   2x10/ 10-15"   JAYY neutral c-spine hold with  cervical retraction with tband  Resume  Blue band   10" 7x  Reviewed HEP    Iron neck   NV NP 2* neck soreness  Attempted-unable to properly fit helmet on pt             Ther Activity                        Gait Training                        Modalities deferred  deferred deferred deferred

## 2020-09-09 ENCOUNTER — OFFICE VISIT (OUTPATIENT)
Dept: PHYSICAL THERAPY | Facility: CLINIC | Age: 25
End: 2020-09-09
Payer: COMMERCIAL

## 2020-09-09 DIAGNOSIS — M54.2 NECK PAIN: Primary | ICD-10-CM

## 2020-09-09 DIAGNOSIS — S06.0X0D CONCUSSION, MENTAL CONFUSION OR DISORIENTATION NO LOSS CONSCIOUS, SUBSEQUENT ENCOUNTER: ICD-10-CM

## 2020-09-09 PROCEDURE — 97140 MANUAL THERAPY 1/> REGIONS: CPT | Performed by: PHYSICAL THERAPIST

## 2020-09-09 PROCEDURE — 97110 THERAPEUTIC EXERCISES: CPT | Performed by: PHYSICAL THERAPIST

## 2020-09-09 NOTE — PROGRESS NOTES
Daily Note     Today's date: 2020  Patient name: April Valencia  : 1995  MRN: [id number]  Referring provider: Jaime Alonso MD  Dx:   Encounter Diagnosis     ICD-10-CM    1  Neck pain  M54 2    2  Concussion, mental confusion or disorientation no loss conscious, subsequent encounter  S06 0X0D                   Subjective: Pt reporting that she has an appointment with the Dr  Next week sometime  Objective: See treatment diary below      Assessment:  Pt with minimal HA to start session; no changes in symptoms noted t/o TE session  PT notes remaining restrictions in B UT and cervical paraspinals with IASTM technique and palpable "clicking" in c-spine with manual therapy  Pt was advised to follow up with MD at this point as therapy does not appear to be addressing remaining symptoms at present  Plan: Continue per plan of care  Pending approval of more visits per insurance and return to MD for further assessment        Precautions HA, s/p concussion    Re-eval Date:   Ins: 16 visit, exp 9/10      Date    Visit Count 16/16 12/16 13/16 14/16 15/16   FOTO        Pain In 10 3/10 4/10 5/10 5/10   Pain Out /10 3/10 2/10  3/10 2-310           Manuals    SOR to tolerance, MET to suboccipitals, STM B posterior c-spine/UTs/suboccpitials, gentle c-spine manual traction, UT stretch B GIASTM posterior cspine and suboccipital region B and B SCMs as miladis pt seated instrument #4 gentle sweeping/fanning;  SOR, intermittent manual c-spine traction  To tolerance,  B SCM and UT stretch  25' total GIASTM posterior cspine and suboccipital region B and B SCMs as miladis pt seated instrument #4 gentle sweeping/fanning;  SOR, intermittent manual c-spine traction  To tolerance,  B SCM and UT stretch  35' total GIASTM posterior cspine and suboccipital region B and B SCMs as miladis pt seated instrument #4 gentle sweeping/fanning;  SOR, intermittent manual c-spine traction  To tolerance,  B SCM and UT stretch  30'' total GIASTM posterior cspine and suboccipital region B and B SCMs as miladis pt seated instrument #4 gentle sweeping/fanning;  SOR, intermittent manual c-spine traction  To tolerance,  B SCM and UT stretch  30' total GIASTM posterior cspine and suboccipital region B and B SCMs as miladis pt seated instrument #4 gentle sweeping/fanning;  SOR, intermittent manual c-spine traction  To tolerance,  B SCM and UT stretch  30' total                           Neuro Re-Ed         DNF activation supine with chin tucks HEP       DNF hold with stabilizer feedback HEP       Neutral/resting jaw position sitting/supine HEP                                       Ther Ex        UBE alt 10' 60 rpm  Alt  Resume  UA 10' 60 rpm  Alt  10' 60 rpm  Alt  10' 60 rpm  Alt    UT stretch    Levator stretch    Scalene stretch  Supine DNF endurance  4x 25" holds   Cues Supine DNF endurance  4x 25" holds   Cues Reviewed HEP    Prone I, T, Ys      Prone rows               Prone neck extension with cues over EOT  10x/10-15" 15x/10-15" ea 15x/10-15" ea Reviewed HEP      NV      Prone neck extension with cues over EOT  10x/10"               Prone neck extension with cues over EOT  10x/10-15"   Scap push up plus  Full Planks  2x10   Full Planks  2x10   Reviewed HEP    Prone   Pivot at wall - endurance B ER with tband with ball on wall cervical stabilization   Blue   2x10/ 5" B ER with tband with ball on wall cervical stabilization   Blue   30x/10"  HEP B ER with tband with ball on wall cervical stabilization   Blue   30x/10"     JAYY neutral c-spine hold with  cervical retraction with tband    10" 7x  Reviewed HEP    Iron neck   NV NP 2* neck soreness  Attempted-unable to properly fit helmet on pt             Ther Activity                        Gait Training                        Modalities deferred  deferred deferred deferred

## 2020-09-18 ENCOUNTER — OFFICE VISIT (OUTPATIENT)
Dept: OBGYN CLINIC | Facility: MEDICAL CENTER | Age: 25
End: 2020-09-18
Payer: COMMERCIAL

## 2020-09-18 VITALS — BODY MASS INDEX: 23.05 KG/M2 | TEMPERATURE: 98.6 F | HEIGHT: 64 IN | WEIGHT: 135 LBS

## 2020-09-18 DIAGNOSIS — G89.29 CHRONIC NECK PAIN: Primary | ICD-10-CM

## 2020-09-18 DIAGNOSIS — M54.2 CHRONIC NECK PAIN: Primary | ICD-10-CM

## 2020-09-18 PROCEDURE — 1036F TOBACCO NON-USER: CPT | Performed by: EMERGENCY MEDICINE

## 2020-09-18 PROCEDURE — 99213 OFFICE O/P EST LOW 20 MIN: CPT | Performed by: EMERGENCY MEDICINE
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
## 2020-09-18 NOTE — PATIENT INSTRUCTIONS
You may use Advil (ibuprofen) 600mg every 6 hours OR Aleve (naproxen) 250-500mg every 12 hours as needed for pain and inflammation  You may also take Tylenol 500mg every 4-6 hours as needed  Check with your primary care physician to see if these medications are safe to take and to make sure they do not interfere with your other medications and medical issues  If no improvement with ibuprofen in a week, call and we will prescribe oral steroids  While taking oral steroids (Prednisone, Medrol dose pack) do not take any NSAIDs such as Advil, ibuprofen, Motrin, Aleve or naproxen  You can restart the NSAIDs after you finish the steroids  However you may take Tylenol with these medications    While taking oral steroids, you may experience mild side effects such as feeling jittery or flushing  Please call if your side effects are significant or you have any questions  Neck Pain   WHAT YOU NEED TO KNOW:   You may have sudden neck pain that increases quickly  You may instead feel pain build slowly over time  Neck pain may go away in a few days or weeks, or it may continue for months  The pain may come and go, or be worse with certain movements  The pain may be only in your neck, or it may move to your arms, back, or shoulders  You may also have pain that starts in another body area and moves to your neck  Some types of neck pain are permanent  DISCHARGE INSTRUCTIONS:   Return to the emergency department if:   · You have an injury that causes neck pain and shooting pain down your arms or legs  · Your neck pain suddenly becomes severe  · You have neck pain along with numbness, tingling, or weakness in your arms or legs  · You have a stiff neck, a headache, and a fever  Contact your healthcare provider if:   · You have new or worsening symptoms  · Your symptoms continue even after treatment  · You have questions or concerns about your condition or care  Medicines:   You may need any of the following:  · NSAIDs  , such as ibuprofen, help decrease swelling, pain, and fever  This medicine is available without a doctor's order  Ask your healthcare provider which medicine to take and how often to take it  Follow directions  NSAIDs can cause stomach bleeding or kidney problems if not taken correctly  If you take blood thinner medicine, always ask if NSAIDs are safe for you  · Acetaminophen  helps decrease pain and fever  Ask your healthcare provider how much to take and how often to take it  Follow directions  Acetaminophen can cause liver damage if not taken correctly  · Steroid medicine  may be used to reduce inflammation  This can help relieve pain caused by swelling  · Take your medicine as directed  Contact your healthcare provider if you think your medicine is not helping or if you have side effects  Tell him or her if you are allergic to any medicine  Keep a list of the medicines, vitamins, and herbs you take  Include the amounts, and when and why you take them  Bring the list or the pill bottles to follow-up visits  Carry your medicine list with you in case of an emergency  Manage or prevent neck pain:   · Rest your neck as directed  Do not make sudden movements, such as turning your head quickly  Your healthcare provider may recommend you wear a cervical collar for a short time  The collar will prevent you from moving your head  This will give your neck time to heal if an injury is causing your neck pain  Ask your healthcare provider when you can return to sports or other normal daily activities  · Apply heat as directed  Heat helps relieve pain and swelling  Use a heat wrap, or soak a small towel in warm water  Wring out the extra water  Apply the heat wrap or towel for 20 minutes every hour, or as directed  · Apply ice as directed  Ice helps relieve pain and swelling, and can help prevent tissue damage  Use an ice pack, or put ice in a bag   Cover the ice pack or back with a towel before you apply it to your neck  Apply the ice pack or ice for 15 minutes every hour, or as directed  Your healthcare provider can tell you how often to apply ice  · Do neck exercises as directed  Neck exercises help strengthen the muscles and increase range of motion  Your healthcare provider will tell you which exercises are right for you  He may give you instructions, or he may recommend that you work with a physical therapist  Your healthcare provider or therapist can make sure you are doing the exercises correctly  · Maintain good posture  Try to keep your head and shoulders lifted when you sit  If you work in front of a computer, make sure the monitor is at the right level  You should not need to look up down to see the screen  You should also not have to lean forward to be able to read what is on the screen  Make sure your keyboard, mouse, and other computer items are placed where you do not have to extend your shoulder to reach them  Get up often if you work in front of a computer or sit for long periods of time  Stretch or walk around to keep your neck muscles loose  Follow up with your healthcare provider as directed: Your healthcare provider may refer you to a specialist if your pain does not get better with treatment  Write down your questions so you remember to ask them during your visits  © 2017 [employer]  Information is for End User's use only and may not be sold, redistributed or otherwise used for commercial purposes  All illustrations and images included in CareNotes® are the copyrighted property of [employer]  or Heriberto Daniel  The above information is an  only  It is not intended as medical advice for individual conditions or treatments  Talk to your doctor, nurse or pharmacist before following any medical regimen to see if it is safe and effective for you

## 2020-10-01 NOTE — PROGRESS NOTES
PT Re-Evaluation  and PT Discharge    Today's date: 10/1/2020  Patient name: Bettye Hooks  : 1995  MRN: [id number]  Referring provider: Kasey Trujillo MD  Dx:   Encounter Diagnosis     ICD-10-CM    1  Neck pain  M54 2    2  Concussion, mental confusion or disorientation no loss conscious, subsequent encounter  S06 [id number]        Start Time: 1500  Stop Time: 1550  Total time in clinic (min): 50 minutes    Assessment  Assessment details: Update 10/1: Pt attended one last PT session after most recent re-evaluation  At that time it was decided that pt does not appear to be making any additional signifigant progress with continued therapy and f/u with MD was recommended  Pt also had run out of approved units for cont'd PT tx   Pt did not return to therapy after last attended PT session 2020  Pt will be d/c to HEP  at this time  Thank you for your referral      Impairments: abnormal muscle tone, activity intolerance, poor posture  and poor body mechanics  Other impairment: HA     Symptom irritability: moderateUnderstanding of Dx/Px/POC: good   Prognosis: good    Goals  STGs to be achieved in 4-6 weeks:    1  Pt will report reduced neck pain and HA pain levels "at worst" by at least 2 points (0-10 scale) in order to allow improved tolerance for functional mobility and reduced reliance on medication or modalities for pain relief  - met   2  Pt will demonstrate improved deep neck flexor (DNF) strength and muscle endurance as noted by at least 10 second improvement in DNF test of endurance from [facility] with proper form/tehnique without need for cues  - met  3  Pt to demonstrate reduced muscle tension and tenderness to palpation of B suboccipital region and upper c-spine by at least 50% from [facility] in order to restore soft tissue extensibility, reduced pan, and normalized ROM  - met   4   Pt will report overall improved tolerance for sustained sitting by at least 25-50% since [facility] with overall reduced pain/HA levels and reduced fear avoidance  - progressing, cont'd sx with computer use   5  Pt will demonstrate improved lower cervical extensor strength and muscle endurance as noted by ability to maintain neutral neck position in prone with head over EOT for at least 20 seconds with good form without need for cues  LTGs to be achieved in 8-12 weeks:    1  Pt will be independent with HEP, demonstrating proper technique with exercises and understanding of self progression of program without need for cueing or assistance  - progressing   2  Pt will report minimized pain levels with at least 75% reduction in pain since [facility]  - not met 2* return to school    3  Pt will demonstrate WFL AROM and strength of c-spine and SCT region without pain/sx  - progressing   4  Pt will demonstrate understanding of proper posture and impact of poor posture/sustained poor postures on patient pain/sx  - progressing   5  Pt will report return to work and studying without limitations  - progressing   6  FOTO will reflect score at discharge that is greater than or equal to predicted level  - progressing               Subjective Evaluation    History of Present Illness  Mechanism of injury: Pt reports overall slight improvement from last Re-eval  Notes her HA/neck pain are no longer constant/every day  Notes slight reduction in intensity and frequency of overall HA  Notes she has noticed her neck pain always starts prior to onset of her HA  Notes neck pain/HA cont to be triggered and exacerbated by working on the computer and 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  No neck pain/HA with walking around/instructing students, however increased neck pain/HA today after spending all day on the computer  Overall notes no new sx/complaints  Paib remains in upper c-spine on R and L and into proximal R/L UT  No radicular sx, no n/t, no dizziness, no vision change or eye strain, no imbalance, etc  Has not yet RTD for f/u     Quality of life: good    Pain  Current pain ratin  At best pain ratin  At worst pain ratin  Location: Neck pain posterior c-spine B L > R, UTs L> R   Quality: dull ache and tight  Relieving factors: heat and medications  Aggravating factors: sitting  Progression: improved    Social Support  Lives with: spouse    Employment status: working (Full time teacher, also in grad school )  Hand dominance: right      Diagnostic Tests  Abnormal MRI: upcoming  Treatments  Current treatment: physical therapy  Current treatment comments: Melatonin, ibuprophen prn   Patient Goals  Patient goals for therapy: decreased pain, independence with ADLs/IADLs and return to sport/leisure activities          Objective     Concurrent Complaints  Positive for headaches and history of trauma  Negative for night pain, disturbed sleep, dizziness, faints, nausea/motion sickness, tinnitus, trouble swallowing, difficulty breathing, shortness of breath, respiratory pain and visual change    Additional Special Questions  No new trauma   No new sx/complaints     Postural Observations  Seated posture: fair  Standing posture: fair  Correction of posture: makes symptoms better    Additional Postural Observation Details  Forward head/rounded shoulders- continues   Increased thoracic kyphosis - continues   B scapular depression and protraction with mild winging -continues   Overall improving postural awareness with exercises; continues to assume fair-poor resting posture         Tenderness   Cervical Spine   No tenderness in the spinous process       Additional Tenderness Details  Moderate ttp B suboccipital region, L >R; continues, mild at re-eval   Moderate ttp L SCM/scalenes and L UT> R proximally - continues mild  Continues with increased tension R/L UT mm - improved with PROM/manual tx     Moderate ttp centrally over spinous processes T1-C2/3; no radicular sx or change in HA with palpation or gentle mobilization-not noted at re-eval   Reduced HA however noted after manual techniques - continues   Will cont to assess     Neurological Testing     Sensation   Cervical/Thoracic   Left   Intact: light touch    Right   Intact: light touch    Active Range of Motion   Cervical/Thoracic Spine       Cervical    Flexion:  WFL  Extension: 58 degrees     WFL  Left lateral flexion: Neck active lateral bend left: "tight/discomfort" suboccipital region at end range      [facility]  Right lateral flexion: Neck active lateral bend right: "tight/discomfort" suboccipital region at end range       WFL  Left rotation:  WFL  Right rotation: Neck active rotation right:      [facility]    Additional Active Range of Motion Details  NO change in neck pain/HA with or following AROM assessment     Passive Range of Motion   Cervical/Thoracic Spine   Normal passive range of motion    Joint Play   Joints within functional limits: C3, C4, C5, C6 and C7     Comments: T-spine TBA    Normal PROM all planes c-spine- no pain/sx with overpressure at all end ranges of PROM     Strength/Myotome Testing     Left Shoulder     Planes of Motion   Flexion: 5   Abduction: 4+   External rotation at 0°: 4+   Internal rotation at 0°: 4+     Isolated Muscles   Lower trapezius: 4   Middle trapezius: 4   Upper trapezius: 5     Right Shoulder     Planes of Motion   Flexion: 5   Abduction: 4+   External rotation at 0°: 4+   Internal rotation at 0°: 4+     Isolated Muscles   Lower trapezius: 4   Middle trapezius: 4   Upper trapezius: 5     Left Elbow   Flexion: 4+  Extension: 4+    Right Elbow   Flexion: 4+  Extension: 4+    Additional Strength Details  B UE strength grossly WNL without pain/sx     Continues with weakness and reduced mm endurance B MT/LT mms however is improving with exercise program - improving     Tests   Cervical   Negative vertical compression, lumbar distraction, Lhermitte's sign, alar ligament test, Sharp-Chantal test, craniocervical flexion test  and neck flexor muscle endurance test      Left   Positive Spurling's Test A  Negative cervical flexion-rotation test      Right   Positive Spurling's Test A  Lumbar   Negative vertical compression  Additional Tests Details      +R/L Spurlings for local L > R suboccipital/upper c-spine region pain centrally; no radicular pain/sx     Continues with mild deficits with craniocervical flexion with reduced strength with DNF with stabilizer for feedback -improving; can reach gray level on stabilizer for 10 sec hold for ten reps   Mild endurance deficits with DNF endurance test= 20-25 seconds today with min compensation - resolved/WNL, can hold >35" with good form without pain/sx     TMJ   Joint sounds right: clicking  Opening (mm): right deviation   ROM comments: Clicking noted /palpated upon opening and closing jaw at R TMJ  Mild ttp R> L LTMJ  Lateral deviation to right noted with end range jaw opening  Mild ttp R and L temporal mms; no ttp R/L masseter mms   Will cont to assess   Neuro Exam:     Headaches   Patient reports headaches: Yes             Flowsheet Rows      Most Recent Value   PT/OT G-Codes   Current Score  65   Projected Score  73

## 2021-03-26 DIAGNOSIS — Z23 ENCOUNTER FOR IMMUNIZATION: ICD-10-CM

## 2021-06-26 ENCOUNTER — HOSPITAL ENCOUNTER (EMERGENCY)
Facility: HOSPITAL | Age: 26
Discharge: HOME | End: 2021-06-26
Attending: EMERGENCY MEDICINE | Admitting: EMERGENCY MEDICINE
Payer: COMMERCIAL

## 2021-06-26 VITALS
OXYGEN SATURATION: 100 % | SYSTOLIC BLOOD PRESSURE: 157 MMHG | RESPIRATION RATE: 15 BRPM | HEART RATE: 87 BPM | BODY MASS INDEX: 23.17 KG/M2 | WEIGHT: 135 LBS | TEMPERATURE: 98 F | DIASTOLIC BLOOD PRESSURE: 103 MMHG

## 2021-06-26 DIAGNOSIS — T30.0 BURN: Primary | ICD-10-CM

## 2021-06-26 PROCEDURE — 90471 IMMUNIZATION ADMIN: CPT

## 2021-06-26 PROCEDURE — 90715 TDAP VACCINE 7 YRS/> IM: CPT | Performed by: EMERGENCY MEDICINE

## 2021-06-26 PROCEDURE — 99284 EMERGENCY DEPT VISIT MOD MDM: CPT | Performed by: EMERGENCY MEDICINE

## 2021-06-26 PROCEDURE — 99283 EMERGENCY DEPT VISIT LOW MDM: CPT

## 2021-06-26 RX ORDER — BACITRACIN, NEOMYCIN, POLYMYXIN B 400; 3.5; 5 [USP'U]/G; MG/G; [USP'U]/G
1 OINTMENT TOPICAL ONCE
Status: COMPLETED | OUTPATIENT
Start: 2021-06-26 | End: 2021-06-26

## 2021-06-26 RX ORDER — OXYCODONE HYDROCHLORIDE AND ACETAMINOPHEN 5; 325 MG/1; MG/1
1 TABLET ORAL ONCE
Status: COMPLETED | OUTPATIENT
Start: 2021-06-26 | End: 2021-06-26

## 2021-06-26 RX ORDER — BACITRACIN, NEOMYCIN, POLYMYXIN B 400; 3.5; 5 [USP'U]/G; MG/G; [USP'U]/G
1 OINTMENT TOPICAL ONCE
Status: DISCONTINUED | OUTPATIENT
Start: 2021-06-26 | End: 2021-06-26

## 2021-06-26 RX ADMIN — BACITRACIN, NEOMYCIN, POLYMYXIN B 1 LARGE APPLICATION: 400; 3.5; 5 OINTMENT TOPICAL at 22:36

## 2021-06-26 RX ADMIN — TETANUS TOXOID, REDUCED DIPHTHERIA TOXOID AND ACELLULAR PERTUSSIS VACCINE, ADSORBED 0.5 ML: 5; 2.5; 8; 8; 2.5 SUSPENSION INTRAMUSCULAR at 22:36

## 2021-06-26 RX ADMIN — OXYCODONE HYDROCHLORIDE AND ACETAMINOPHEN 1 TABLET: 5; 325 TABLET ORAL at 22:36

## 2021-06-26 RX ADMIN — OXYCODONE HYDROCHLORIDE AND ACETAMINOPHEN 1 TABLET: 5; 325 TABLET ORAL at 23:33
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
## 2022-03-09 ENCOUNTER — TELEPHONE (OUTPATIENT)
Dept: FAMILY MEDICINE CLINIC | Facility: CLINIC | Age: 27
End: 2022-03-09

## 2022-03-09 NOTE — TELEPHONE ENCOUNTER
Lm to call back and schedule visit  Patient has not been seen in over 2 years      Patient Attribution List  1st attempt